# Patient Record
Sex: FEMALE | Race: WHITE | NOT HISPANIC OR LATINO | Employment: OTHER | URBAN - METROPOLITAN AREA
[De-identification: names, ages, dates, MRNs, and addresses within clinical notes are randomized per-mention and may not be internally consistent; named-entity substitution may affect disease eponyms.]

---

## 2017-06-23 ENCOUNTER — OFFICE VISIT (OUTPATIENT)
Dept: ORTHOPEDICS | Facility: CLINIC | Age: 77
End: 2017-06-23
Payer: MEDICARE

## 2017-06-23 VITALS
HEIGHT: 63 IN | SYSTOLIC BLOOD PRESSURE: 114 MMHG | BODY MASS INDEX: 26.58 KG/M2 | DIASTOLIC BLOOD PRESSURE: 49 MMHG | HEART RATE: 88 BPM | WEIGHT: 150 LBS

## 2017-06-23 DIAGNOSIS — S72.002D CLOSED FRACTURE OF NECK OF LEFT FEMUR WITH ROUTINE HEALING, SUBSEQUENT ENCOUNTER: Primary | ICD-10-CM

## 2017-06-23 DIAGNOSIS — Z98.890 POST-OPERATIVE STATE: ICD-10-CM

## 2017-06-23 PROCEDURE — 99024 POSTOP FOLLOW-UP VISIT: CPT | Mod: ,,, | Performed by: ORTHOPAEDIC SURGERY

## 2017-06-23 RX ORDER — ENOXAPARIN SODIUM 150 MG/ML
INJECTION SUBCUTANEOUS
COMMUNITY
End: 2022-01-01

## 2017-06-23 RX ORDER — POLYETHYLENE GLYCOL 3350 17 G/17G
17 POWDER, FOR SOLUTION ORAL DAILY
Status: ON HOLD | COMMUNITY
End: 2022-01-01 | Stop reason: HOSPADM

## 2017-06-23 RX ORDER — ISOSORBIDE MONONITRATE 60 MG/1
60 TABLET, EXTENDED RELEASE ORAL DAILY
COMMUNITY
End: 2017-10-06 | Stop reason: DRUGHIGH

## 2017-06-23 RX ORDER — CLONAZEPAM 0.5 MG/1
0.5 TABLET ORAL 2 TIMES DAILY PRN
Status: ON HOLD | COMMUNITY
End: 2022-01-01 | Stop reason: HOSPADM

## 2017-06-23 RX ORDER — PRAVASTATIN SODIUM 20 MG/1
20 TABLET ORAL DAILY
COMMUNITY
End: 2017-10-06 | Stop reason: DRUGHIGH

## 2017-06-23 RX ORDER — DOCUSATE SODIUM 100 MG/1
100 CAPSULE, LIQUID FILLED ORAL 2 TIMES DAILY
Status: ON HOLD | COMMUNITY
End: 2022-01-01 | Stop reason: HOSPADM

## 2017-06-23 RX ORDER — LEVOTHYROXINE SODIUM 75 UG/1
75 TABLET ORAL DAILY
COMMUNITY
End: 2017-10-06 | Stop reason: DRUGHIGH

## 2017-06-23 RX ORDER — INSULIN ASPART 100 [IU]/ML
INJECTION, SOLUTION INTRAVENOUS; SUBCUTANEOUS
COMMUNITY
End: 2022-01-01

## 2017-06-23 RX ORDER — OXYCODONE HCL 5 MG/5 ML
SOLUTION, ORAL ORAL
COMMUNITY
End: 2022-01-01

## 2017-06-23 RX ORDER — TRAMADOL HYDROCHLORIDE 50 MG/1
50 TABLET ORAL 2 TIMES DAILY PRN
Status: ON HOLD | COMMUNITY
Start: 2017-06-05 | End: 2022-01-01 | Stop reason: HOSPADM

## 2017-06-23 RX ORDER — HYDRALAZINE HYDROCHLORIDE 25 MG/1
TABLET, FILM COATED ORAL
COMMUNITY
Start: 2017-05-12 | End: 2022-01-01

## 2017-06-23 RX ORDER — ESCITALOPRAM OXALATE 5 MG/1
5 TABLET ORAL DAILY
COMMUNITY
End: 2017-10-06 | Stop reason: DRUGHIGH

## 2017-06-23 NOTE — PROGRESS NOTES
Subjective:       Chief Complaint    Chief Complaint   Patient presents with    Left Hip - Post-op Evaluation, Injury     DOS: 6/10/2017, reduction and closed pinning of femoral neck fracture left hip.  DOI: 06/09/2017 due to a dog jumping onto her.  Her hip is doing better, and she is attending physical therapy daily.       REMI Cabezas is a 76 y.o.  female who presents Follow-up on closed pinning of minimally displaced femoral neck fracture left hip. Patient is complaining of significant the pain in the hip is much as 8/10 when walking. However, she is getting less pain as time goes on. He is supposed to be toe-touch weightbearing or minimal weightbearing.      Past History  Past Medical History:   Diagnosis Date    Anxiety     Arthritis     Diabetes mellitus type I     Hypertension     Thyroid disease      Past Surgical History:   Procedure Laterality Date    CHOLECYSTECTOMY      CYST REMOVAL      ELBOW SURGERY      HIP SURGERY      left    HYSTERECTOMY      SHOULDER SURGERY       Social History     Social History    Marital status:      Spouse name: N/A    Number of children: N/A    Years of education: N/A     Occupational History    Not on file.     Social History Main Topics    Smoking status: Former Smoker    Smokeless tobacco: Not on file    Alcohol use No    Drug use: Unknown    Sexual activity: Not on file     Other Topics Concern    Not on file     Social History Narrative    No narrative on file         Medications  Current Outpatient Prescriptions   Medication Sig    clonazePAM (KLONOPIN) 0.5 MG tablet Take 0.5 mg by mouth 2 (two) times daily as needed for Anxiety.    docusate sodium (COLACE) 100 MG capsule Take 100 mg by mouth 2 (two) times daily.    enoxaparin (LOVENOX) 150 mg/mL Syrg Inject into the skin.    escitalopram oxalate (LEXAPRO) 5 MG Tab Take 5 mg by mouth once daily.    hydrALAZINE (APRESOLINE) 25 MG tablet     insulin aspart (NOVOLOG) 100 unit/mL  injection Inject into the skin 3 (three) times daily before meals.    isosorbide mononitrate (IMDUR) 60 MG 24 hr tablet Take 60 mg by mouth once daily.    levothyroxine (SYNTHROID) 75 MCG tablet Take 75 mcg by mouth once daily.    oxycodone (ROXICODONE) 5 mg/5 mL Soln Take by mouth.    polyethylene glycol (GLYCOLAX) 17 gram/dose powder Take 17 g by mouth once daily.    pravastatin (PRAVACHOL) 20 MG tablet Take 20 mg by mouth once daily.    tramadol (ULTRAM) 50 mg tablet      No current facility-administered medications for this visit.        Allergies  Review of patient's allergies indicates:   Allergen Reactions    Codeine Shortness Of Breath    Sulfa (sulfonamide antibiotics) Shortness Of Breath         Review of Systems     Constitutional: Negative    HENT: Negative  Eyes: Negative  Respiratory: Negative  Cardiovascular: Negative  Musculoskeletal: HPI  Skin: Negative  Neurological: Negative  Hematological: Negative  Endocrine: Negative      Physical Exam    Vitals:    06/23/17 1408   BP: (!) 114/49   Pulse: 88     Physical Examination:Examination left hip reveals a healed incision laterally with staples, which were removed. She is tender over the lateral aspect of the hip. External rotation is 30°, internal 30, abduction 40, but she has discomfort with movement.     Skin-  General appearance -  well appearing, and in no distress  Mental status - awake  Neck - supple  Chest -  symmetric air entry  Heart - normal rate   Abdomen - soft      Assessment/Plan   Closed fracture of neck of left femur with routine healing, subsequent encounter  -     Cancel: X-Ray Pelvis 3 View inc Hip 2 view Left    Post-operative state  -     Cancel: X-Ray Pelvis 3 View inc Hip 2 view Left      Open 3 weeks. Advised not to bear full weight. Discussed toe-touch for balance only. Is very happy at the [facility]      This note was dictated using voice recognition software and may contain grammatical errors.

## 2017-07-14 ENCOUNTER — OFFICE VISIT (OUTPATIENT)
Dept: ORTHOPEDICS | Facility: CLINIC | Age: 77
End: 2017-07-14
Payer: MEDICARE

## 2017-07-14 VITALS
HEIGHT: 63 IN | HEART RATE: 77 BPM | SYSTOLIC BLOOD PRESSURE: 124 MMHG | DIASTOLIC BLOOD PRESSURE: 50 MMHG | BODY MASS INDEX: 26.58 KG/M2 | WEIGHT: 150 LBS

## 2017-07-14 DIAGNOSIS — Z98.890 POST-OPERATIVE STATE: ICD-10-CM

## 2017-07-14 DIAGNOSIS — S72.002D CLOSED FRACTURE OF NECK OF LEFT FEMUR WITH ROUTINE HEALING, SUBSEQUENT ENCOUNTER: Primary | ICD-10-CM

## 2017-07-14 PROCEDURE — 99024 POSTOP FOLLOW-UP VISIT: CPT | Mod: ,,, | Performed by: ORTHOPAEDIC SURGERY

## 2017-07-14 NOTE — PROGRESS NOTES
Subjective:       Chief Complaint    Chief Complaint   Patient presents with    Post-op Evaluation     4 weeks & 6 days.  DOS: 6/10/2017, reduction and closed pinning of femoral neck fracture left hip.  DOI: 06/09/2017 due to a dog jumping onto her.  She has some soreness.  Patient is @ [facility] & does P.T. daily.  Xrays were done on 7/7/17 & 7/12/17 but not report was provided.       HPI  ePg Cabezas is a 76 y.o.  female who presents Follow-up on femoral neck pinning left hip. She has been compliant and has avoided weightbearing. She gives a history of scoliosis with a right lower extremity being longer than the left. She's been advised that shortening of the femoral neck. Will shorten the left lower extremity.      Past History  Past Medical History:   Diagnosis Date    Anxiety     Arthritis     Diabetes mellitus type I     Hypertension     Thyroid disease      Past Surgical History:   Procedure Laterality Date    CHOLECYSTECTOMY      CYST REMOVAL      ELBOW SURGERY      HIP SURGERY      left    HYSTERECTOMY      SHOULDER SURGERY       Social History     Social History    Marital status:      Spouse name: N/A    Number of children: N/A    Years of education: N/A     Occupational History    Not on file.     Social History Main Topics    Smoking status: Former Smoker    Smokeless tobacco: Never Used    Alcohol use No    Drug use: Unknown    Sexual activity: Not on file     Other Topics Concern    Not on file     Social History Narrative    No narrative on file         Medications  Current Outpatient Prescriptions   Medication Sig    clonazePAM (KLONOPIN) 0.5 MG tablet Take 0.5 mg by mouth 2 (two) times daily as needed for Anxiety.    docusate sodium (COLACE) 100 MG capsule Take 100 mg by mouth 2 (two) times daily.    enoxaparin (LOVENOX) 150 mg/mL Syrg Inject into the skin.    escitalopram oxalate (LEXAPRO) 5 MG Tab Take 5 mg by mouth once daily.    hydrALAZINE  (APRESOLINE) 25 MG tablet     insulin aspart (NOVOLOG) 100 unit/mL injection Inject into the skin 3 (three) times daily before meals.    isosorbide mononitrate (IMDUR) 60 MG 24 hr tablet Take 60 mg by mouth once daily.    levothyroxine (SYNTHROID) 75 MCG tablet Take 75 mcg by mouth once daily.    oxycodone (ROXICODONE) 5 mg/5 mL Soln Take by mouth.    polyethylene glycol (GLYCOLAX) 17 gram/dose powder Take 17 g by mouth once daily.    pravastatin (PRAVACHOL) 20 MG tablet Take 20 mg by mouth once daily.    tramadol (ULTRAM) 50 mg tablet      No current facility-administered medications for this visit.        Allergies  Review of patient's allergies indicates:   Allergen Reactions    Codeine Shortness Of Breath and Anaphylaxis    Erythromycin      Other reaction(s): Unknown  Pt states she doesn't remember the reaction.    Sulfa (sulfonamide antibiotics) Shortness Of Breath    Atorvastatin calcium Rash    Fenofibrate Rash         Review of Systems     Constitutional: Negative    HENT: Negative  Eyes: Negative  Respiratory: Negative  Cardiovascular: Negative  Musculoskeletal: HPI  Skin: Negative  Neurological: Negative  Hematological: Negative  Endocrine: Negative      Physical Exam    Vitals:    07/14/17 1353   BP: (!) 124/50   Pulse: 77     Physical Examination:Examination left hip reveals tenderness over the anterior aspect of the hip. There is discomfort with rotation.     Skin-  General appearance -  well appearing, and in no distress  Mental status - awake  Neck - supple  Chest -  symmetric air entry  Heart - normal rate   Abdomen - soft      Assessment/Plan   Closed fracture of neck of left femur with routine healing, subsequent encounter    Post-operative state      X-ray examination of the left hip shows collapse of the head neck junction with the sliding of the screws. The screws continue to be well positioned, well away from the subchondral bone. The construct looked stable. She will be allowed to  bear weight as tolerated with a walker.      This note was dictated using voice recognition software and may contain grammatical errors.

## 2017-08-18 ENCOUNTER — OFFICE VISIT (OUTPATIENT)
Dept: ORTHOPEDICS | Facility: CLINIC | Age: 77
End: 2017-08-18
Payer: MEDICARE

## 2017-08-18 VITALS
DIASTOLIC BLOOD PRESSURE: 70 MMHG | HEART RATE: 74 BPM | BODY MASS INDEX: 26.58 KG/M2 | SYSTOLIC BLOOD PRESSURE: 141 MMHG | WEIGHT: 150 LBS | HEIGHT: 63 IN

## 2017-08-18 DIAGNOSIS — S72.002D CLOSED FRACTURE OF NECK OF LEFT FEMUR WITH ROUTINE HEALING, SUBSEQUENT ENCOUNTER: ICD-10-CM

## 2017-08-18 DIAGNOSIS — Z98.890 POST-OPERATIVE STATE: Primary | ICD-10-CM

## 2017-08-18 PROCEDURE — 99024 POSTOP FOLLOW-UP VISIT: CPT | Mod: ,,, | Performed by: ORTHOPAEDIC SURGERY

## 2017-08-18 RX ORDER — LEVOTHYROXINE SODIUM 75 UG/1
75 TABLET ORAL
COMMUNITY
Start: 2017-07-26 | End: 2017-09-22

## 2017-08-18 RX ORDER — TRAMADOL HYDROCHLORIDE 50 MG/1
50 TABLET ORAL
COMMUNITY
Start: 2017-07-26 | End: 2017-09-22

## 2017-08-18 RX ORDER — DOCUSATE SODIUM 100 MG/1
100 CAPSULE, LIQUID FILLED ORAL
COMMUNITY
End: 2017-09-22

## 2017-08-18 RX ORDER — ISOSORBIDE MONONITRATE 60 MG/1
60 TABLET, EXTENDED RELEASE ORAL
COMMUNITY
Start: 2017-07-26 | End: 2017-09-22

## 2017-08-18 RX ORDER — CLONAZEPAM 0.5 MG/1
0.5 TABLET ORAL
COMMUNITY
Start: 2017-07-26 | End: 2017-09-22

## 2017-08-18 RX ORDER — POLYETHYLENE GLYCOL 3350 17 G/17G
17 POWDER, FOR SOLUTION ORAL
COMMUNITY
End: 2017-09-22

## 2017-08-18 RX ORDER — ESCITALOPRAM OXALATE 5 MG/1
5 TABLET ORAL
COMMUNITY
Start: 2017-07-26 | End: 2017-09-22

## 2017-08-18 RX ORDER — OXYCODONE HCL 5 MG/5 ML
5 SOLUTION, ORAL ORAL
COMMUNITY
End: 2017-09-22

## 2017-08-18 RX ORDER — PRAVASTATIN SODIUM 20 MG/1
20 TABLET ORAL
COMMUNITY
Start: 2017-07-26 | End: 2017-09-22

## 2017-08-18 NOTE — PROGRESS NOTES
Subjective:       Chief Complaint    Chief Complaint   Patient presents with    Left Hip - Post-op Evaluation     Post-op 9 weeks and 6 days. DOS: 6/10/2017, reduction and closed pinning of femoral neck fracture left hip.  DOI: 06/09/2017 due to a dog jumping onto her.  She has some soreness.  Patient is doing exercises daily at home.       REMI Cabezas is a 76 y.o.  female who presents Follow-up on closed pinning femoral neck fracture left hip. Appears to be having significant pain in the left groin and trochanteric area. Previous x-rays showed collapse of the construct to a stable position. When she walks a lot. It hurts her. Prior to her fall. She was taking 1 or 2 tramadol per day for chronic low back pain.      Past History  Past Medical History:   Diagnosis Date    Anxiety     Arthritis     Diabetes mellitus type I     Hypertension     Thyroid disease      Past Surgical History:   Procedure Laterality Date    CHOLECYSTECTOMY      CYST REMOVAL      ELBOW SURGERY      HIP SURGERY      left    HYSTERECTOMY      SHOULDER SURGERY       Social History     Social History    Marital status:      Spouse name: N/A    Number of children: N/A    Years of education: N/A     Occupational History    Not on file.     Social History Main Topics    Smoking status: Former Smoker    Smokeless tobacco: Never Used    Alcohol use No    Drug use: Unknown    Sexual activity: Not on file     Other Topics Concern    Not on file     Social History Narrative    No narrative on file         Medications  Current Outpatient Prescriptions   Medication Sig    clonazePAM (KLONOPIN) 0.5 MG tablet Take 0.5 mg by mouth 2 (two) times daily as needed for Anxiety.    clonazePAM (KLONOPIN) 0.5 MG tablet Take 0.5 mg by mouth.    docusate sodium (COLACE) 100 MG capsule Take 100 mg by mouth 2 (two) times daily.    docusate sodium (COLACE) 100 MG capsule Take 100 mg by mouth.    enoxaparin (LOVENOX) 150 mg/mL Syrg  Inject into the skin.    escitalopram oxalate (LEXAPRO) 5 MG Tab Take 5 mg by mouth once daily.    escitalopram oxalate (LEXAPRO) 5 MG Tab Take 5 mg by mouth.    hydrALAZINE (APRESOLINE) 25 MG tablet     insulin aspart (NOVOLOG) 100 unit/mL injection Inject into the skin 3 (three) times daily before meals.    isosorbide mononitrate (IMDUR) 60 MG 24 hr tablet Take 60 mg by mouth once daily.    isosorbide mononitrate (IMDUR) 60 MG 24 hr tablet Take 60 mg by mouth.    levothyroxine (SYNTHROID) 75 MCG tablet Take 75 mcg by mouth once daily.    levothyroxine (SYNTHROID) 75 MCG tablet Take 75 mcg by mouth.    oxycodone (ROXICODONE) 5 mg/5 mL Soln Take by mouth.    oxycodone (ROXICODONE) 5 mg/5 mL Soln Take 5 mg by mouth.    polyethylene glycol (GLYCOLAX) 17 gram/dose powder Take 17 g by mouth once daily.    polyethylene glycol (GLYCOLAX) 17 gram/dose powder Take 17 g by mouth.    pravastatin (PRAVACHOL) 20 MG tablet Take 20 mg by mouth once daily.    pravastatin (PRAVACHOL) 20 MG tablet Take 20 mg by mouth.    tramadol (ULTRAM) 50 mg tablet     tramadol (ULTRAM) 50 mg tablet Take 50 mg by mouth.     No current facility-administered medications for this visit.        Allergies  Review of patient's allergies indicates:   Allergen Reactions    Codeine Shortness Of Breath and Anaphylaxis    Erythromycin      Other reaction(s): Unknown  Pt states she doesn't remember the reaction.    Sulfa (sulfonamide antibiotics) Shortness Of Breath    Atorvastatin calcium Rash    Fenofibrate Rash         Review of Systems     Constitutional: Negative    HENT: Negative  Eyes: Negative  Respiratory: Negative  Cardiovascular: Negative  Musculoskeletal: HPI  Skin: Negative  Neurological: Negative  Hematological: Negative  Endocrine: Negative      Physical Exam    Vitals:    08/18/17 1424   BP: (!) 141/70   Pulse: 74     Physical Examination:Very tender over the trochanteric bursa left hip. Anterior hip tenderness present.  Abduction 30, 35°. External rotation 60°, internal rotation 20°. Terminal motion appears to be very uncomfortable.     Skin-clear  General appearance -  well appearing, and in no distress  Mental status - awake  Neck - supple  Chest -  symmetric air entry  Heart - normal rate   Abdomen - soft      Assessment/Plan   Post-operative state    Closed fracture of neck of left femur with routine healing, subsequent encounter    2 views of the left hip femoral neck fracture appears healed. Also appears to have cystic changes in the humeral head. Will discuss possibility of which study to do to evaluate the femoral head for AVN.        This note was dictated using voice recognition software and may contain grammatical errors.

## 2017-09-22 ENCOUNTER — OFFICE VISIT (OUTPATIENT)
Dept: ORTHOPEDICS | Facility: CLINIC | Age: 77
End: 2017-09-22
Payer: MEDICARE

## 2017-09-22 VITALS
HEIGHT: 63 IN | SYSTOLIC BLOOD PRESSURE: 97 MMHG | BODY MASS INDEX: 26.58 KG/M2 | HEART RATE: 76 BPM | WEIGHT: 150 LBS | DIASTOLIC BLOOD PRESSURE: 42 MMHG

## 2017-09-22 DIAGNOSIS — Z98.890 POST-OPERATIVE STATE: Primary | ICD-10-CM

## 2017-09-22 DIAGNOSIS — S72.002D CLOSED FRACTURE OF NECK OF LEFT FEMUR WITH ROUTINE HEALING, SUBSEQUENT ENCOUNTER: ICD-10-CM

## 2017-09-22 PROCEDURE — 99213 OFFICE O/P EST LOW 20 MIN: CPT | Mod: ,,, | Performed by: ORTHOPAEDIC SURGERY

## 2017-09-22 PROCEDURE — 1159F MED LIST DOCD IN RCRD: CPT | Mod: ,,, | Performed by: ORTHOPAEDIC SURGERY

## 2017-09-22 PROCEDURE — 73502 X-RAY EXAM HIP UNI 2-3 VIEWS: CPT | Mod: LT,,, | Performed by: ORTHOPAEDIC SURGERY

## 2017-09-22 RX ORDER — TRAMADOL HYDROCHLORIDE 50 MG/1
100 TABLET ORAL EVERY 12 HOURS PRN
Qty: 120 TABLET | Refills: 3 | Status: SHIPPED | OUTPATIENT
Start: 2017-09-22 | End: 2017-10-02

## 2017-09-22 RX ORDER — TRAMADOL HYDROCHLORIDE 50 MG/1
50 TABLET ORAL DAILY
COMMUNITY
Start: 2017-09-13 | End: 2017-09-22

## 2017-09-22 NOTE — PROGRESS NOTES
Subjective:       Chief Complaint    Chief Complaint   Patient presents with    Follow-up     Left hip- DOS: 6/10/2017, reduction and closed pinning of femoral neck fracture left hip.  DOI: 06/09/2017 due to a dog jumping onto her.  Her left hip is still painful and using a walker.       REMI Cabezas is a 76 y.o.  female who presents Continued pain in the left groin areas following percutaneous pinning of femoral neck fracture. The pain level skin gets 7-8 4/10. It has not worsened nor has it improved. She gets relief with rest.      Past History  Past Medical History:   Diagnosis Date    Anxiety     Arthritis     Diabetes mellitus type I     Hypertension     Thyroid disease      Past Surgical History:   Procedure Laterality Date    CHOLECYSTECTOMY      CYST REMOVAL      ELBOW SURGERY      HIP SURGERY      left    HYSTERECTOMY      SHOULDER SURGERY       Social History     Social History    Marital status:      Spouse name: N/A    Number of children: N/A    Years of education: N/A     Occupational History    Not on file.     Social History Main Topics    Smoking status: Former Smoker    Smokeless tobacco: Never Used    Alcohol use No    Drug use: Unknown    Sexual activity: Not on file     Other Topics Concern    Not on file     Social History Narrative    No narrative on file         Medications  Current Outpatient Prescriptions   Medication Sig    clonazePAM (KLONOPIN) 0.5 MG tablet Take 0.5 mg by mouth 2 (two) times daily as needed for Anxiety.    docusate sodium (COLACE) 100 MG capsule Take 100 mg by mouth 2 (two) times daily.    enoxaparin (LOVENOX) 150 mg/mL Syrg Inject into the skin.    escitalopram oxalate (LEXAPRO) 5 MG Tab Take 5 mg by mouth once daily.    hydrALAZINE (APRESOLINE) 25 MG tablet     insulin aspart (NOVOLOG) 100 unit/mL injection Inject into the skin 3 (three) times daily before meals.    isosorbide mononitrate (IMDUR) 60 MG 24 hr tablet Take 60 mg  by mouth once daily.    levothyroxine (SYNTHROID) 75 MCG tablet Take 75 mcg by mouth once daily.    oxycodone (ROXICODONE) 5 mg/5 mL Soln Take by mouth.    polyethylene glycol (GLYCOLAX) 17 gram/dose powder Take 17 g by mouth once daily.    pravastatin (PRAVACHOL) 20 MG tablet Take 20 mg by mouth once daily.    tramadol (ULTRAM) 50 mg tablet      No current facility-administered medications for this visit.        Allergies  Review of patient's allergies indicates:   Allergen Reactions    Codeine Shortness Of Breath and Anaphylaxis    Erythromycin      Other reaction(s): Unknown  Pt states she doesn't remember the reaction.    Sulfa (sulfonamide antibiotics) Shortness Of Breath    Atorvastatin calcium Rash    Fenofibrate Rash         Review of Systems     Constitutional: Negative    HENT: Negative  Eyes: Negative  Respiratory: Negative  Cardiovascular: Negative  Musculoskeletal: HPI  Skin: Negative  Neurological: Negative  Hematological: Negative  Endocrine: Negative      Physical Exam    Vitals:    09/22/17 1129   BP: (!) 97/42   Pulse: 76     Physical Examination:Tender over the anterior aspect of the left hip. Abduction 35° with pain. Hip flexion to 90° with external rotation 60°, internal rotation 15°. Terminal motion is painful. She walks with a prominent pronounced limp.     Skin-clear  General appearance -  well appearing, and in no distress  Mental status - awake  Neck - supple  Chest -  symmetric air entry  Heart - normal rate   Abdomen - soft      Assessment/Plan   Post-operative state  -     X-Ray Hip 2 or 3 views Left    Closed fracture of neck of left femur with routine healing, subsequent encounter  -     X-Ray Hip 2 or 3 views Left            This note was dictated using voice recognition software and may contain grammatical errors.

## 2017-10-06 ENCOUNTER — OFFICE VISIT (OUTPATIENT)
Dept: ORTHOPEDICS | Facility: CLINIC | Age: 77
End: 2017-10-06
Payer: MEDICARE

## 2017-10-06 VITALS
SYSTOLIC BLOOD PRESSURE: 125 MMHG | DIASTOLIC BLOOD PRESSURE: 66 MMHG | HEIGHT: 63 IN | WEIGHT: 150 LBS | HEART RATE: 74 BPM | BODY MASS INDEX: 26.58 KG/M2

## 2017-10-06 DIAGNOSIS — S72.002D CLOSED FRACTURE OF NECK OF LEFT FEMUR WITH ROUTINE HEALING, SUBSEQUENT ENCOUNTER: ICD-10-CM

## 2017-10-06 DIAGNOSIS — M70.62 GREATER TROCHANTERIC BURSITIS OF LEFT HIP: Primary | ICD-10-CM

## 2017-10-06 DIAGNOSIS — Z98.890 POST-OPERATIVE STATE: ICD-10-CM

## 2017-10-06 PROCEDURE — 99212 OFFICE O/P EST SF 10 MIN: CPT | Mod: ,,, | Performed by: ORTHOPAEDIC SURGERY

## 2017-10-06 RX ORDER — MECLIZINE HYDROCHLORIDE 25 MG/1
1 TABLET ORAL 3 TIMES DAILY
COMMUNITY
Start: 2017-09-29 | End: 2022-01-01

## 2017-10-06 RX ORDER — ISOSORBIDE MONONITRATE 30 MG/1
1 TABLET, EXTENDED RELEASE ORAL DAILY
COMMUNITY
Start: 2017-09-29 | End: 2022-01-01

## 2017-10-06 RX ORDER — PRAVASTATIN SODIUM 40 MG/1
1 TABLET ORAL DAILY
COMMUNITY
Start: 2017-09-29 | End: 2022-01-01

## 2017-10-06 RX ORDER — GABAPENTIN 100 MG/1
1 CAPSULE ORAL 3 TIMES DAILY
Status: ON HOLD | COMMUNITY
Start: 2017-09-29 | End: 2022-01-01 | Stop reason: HOSPADM

## 2017-10-06 RX ORDER — LEVOTHYROXINE SODIUM 88 UG/1
1 TABLET ORAL DAILY
Status: ON HOLD | COMMUNITY
Start: 2017-09-29 | End: 2022-01-01 | Stop reason: HOSPADM

## 2017-10-06 RX ORDER — ESCITALOPRAM OXALATE 20 MG/1
1 TABLET ORAL DAILY
Status: ON HOLD | COMMUNITY
Start: 2017-09-29 | End: 2022-01-01 | Stop reason: HOSPADM

## 2017-10-06 NOTE — PROGRESS NOTES
Subjective:       Chief Complaint    Chief Complaint   Patient presents with    Results     Patient is here to review her MRI of the left hip w/o contrast performed on 9/27/17 @ [facility]. Her hip is getting worse.  She is currently using a walker to ambulate.        HPI  Peg Cabezas is a 76 y.o.  female who presents Follow-up on left hip MRI. Report does not identify avascular necrosis in the left hip. It does mention atrophy of the gluteus medius and minimus. She has been more active than usual.      Past History  Past Medical History:   Diagnosis Date    Anxiety     Arthritis     Diabetes mellitus type I     Hypertension     Thyroid disease      Past Surgical History:   Procedure Laterality Date    CHOLECYSTECTOMY      CYST REMOVAL      ELBOW SURGERY      HIP SURGERY      left    HYSTERECTOMY      SHOULDER SURGERY       Social History     Social History    Marital status:      Spouse name: N/A    Number of children: N/A    Years of education: N/A     Occupational History    Not on file.     Social History Main Topics    Smoking status: Former Smoker    Smokeless tobacco: Never Used    Alcohol use No    Drug use: Unknown    Sexual activity: Not on file     Other Topics Concern    Not on file     Social History Narrative    No narrative on file         Medications  Current Outpatient Prescriptions   Medication Sig    clonazePAM (KLONOPIN) 0.5 MG tablet Take 0.5 mg by mouth 2 (two) times daily as needed for Anxiety.    docusate sodium (COLACE) 100 MG capsule Take 100 mg by mouth 2 (two) times daily.    enoxaparin (LOVENOX) 150 mg/mL Syrg Inject into the skin.    escitalopram oxalate (LEXAPRO) 20 MG tablet Take 1 tablet by mouth once daily.    gabapentin (NEURONTIN) 100 MG capsule Take 1 capsule by mouth 3 (three) times daily.    hydrALAZINE (APRESOLINE) 25 MG tablet     insulin aspart (NOVOLOG) 100 unit/mL injection Inject into the skin 3 (three) times daily before meals.     isosorbide mononitrate (IMDUR) 30 MG 24 hr tablet Take 1 tablet by mouth once daily.    levothyroxine (SYNTHROID) 88 MCG tablet Take 1 tablet by mouth once daily.    meclizine (ANTIVERT) 25 mg tablet Take 1 tablet by mouth 3 (three) times daily.    oxycodone (ROXICODONE) 5 mg/5 mL Soln Take by mouth.    polyethylene glycol (GLYCOLAX) 17 gram/dose powder Take 17 g by mouth once daily.    pravastatin (PRAVACHOL) 40 MG tablet Take 1 tablet by mouth once daily.    tramadol (ULTRAM) 50 mg tablet      No current facility-administered medications for this visit.        Allergies  Review of patient's allergies indicates:   Allergen Reactions    Codeine Shortness Of Breath and Anaphylaxis    Erythromycin      Other reaction(s): Unknown  Pt states she doesn't remember the reaction.    Sulfa (sulfonamide antibiotics) Shortness Of Breath    Atorvastatin calcium Rash    Fenofibrate Rash         Review of Systems     Constitutional: Negative    HENT: Negative  Eyes: Negative  Respiratory: Negative  Cardiovascular: Negative  Musculoskeletal: HPI  Skin: Negative  Neurological: Negative  Hematological: Negative  Endocrine: Negative      Physical Exam    Vitals:    10/06/17 1022   BP: 125/66   Pulse: 74     Physical Examination:Examination left hip reveals abduction 40° flexion. 105°, external rotation 60°, internal rotation 20°. Very tender over the greater trochanteric bursa     Skin-clear  General appearance -  well appearing, and in no distress  Mental status - awake  Neck - supple  Chest -  symmetric air entry  Heart - normal rate   Abdomen - soft      Assessment/Plan   Greater trochanteric bursitis of left hip    Closed fracture of neck of left femur with routine healing, subsequent encounter    Post-operative state    . We'll start physical therapy at [facility] in [place], [place].        This note was dictated using voice recognition software and may contain grammatical errors.

## 2017-11-17 ENCOUNTER — OFFICE VISIT (OUTPATIENT)
Dept: ORTHOPEDICS | Facility: CLINIC | Age: 77
End: 2017-11-17
Payer: MEDICARE

## 2017-11-17 VITALS
HEIGHT: 63 IN | SYSTOLIC BLOOD PRESSURE: 119 MMHG | HEART RATE: 82 BPM | BODY MASS INDEX: 26.58 KG/M2 | DIASTOLIC BLOOD PRESSURE: 64 MMHG | WEIGHT: 150 LBS

## 2017-11-17 DIAGNOSIS — S72.002D CLOSED FRACTURE OF NECK OF LEFT FEMUR WITH ROUTINE HEALING, SUBSEQUENT ENCOUNTER: Primary | ICD-10-CM

## 2017-11-17 PROCEDURE — 99212 OFFICE O/P EST SF 10 MIN: CPT | Mod: ,,, | Performed by: ORTHOPAEDIC SURGERY

## 2017-11-17 NOTE — PROGRESS NOTES
Subjective:       Chief Complaint    Chief Complaint   Patient presents with    Follow-up     Left hip. DOS: 6/10/2017, reduction and closed pinning of femoral neck fracture left hip.  DOI: 06/09/2017 due to a dog jumping onto her. Patient states she is doing well and has no complaints. She is attending PT @ [facility] 3 times a week stating it is going ok. She is feeling a little pain and discomfort due to having out of town company and being on her feet more. She is taking Tramadol twice daily with good relief.        HPI  Peg Cabezas is a 77 y.o.  female who presents Follow-up femoral neck fracture left hip. Overall doing better than previously. Attending physical therapy 3 days a week at [facility]. And she is enjoying going there.      Past History  Past Medical History:   Diagnosis Date    Anxiety     Arthritis     Diabetes mellitus type I     Hypertension     Thyroid disease      Past Surgical History:   Procedure Laterality Date    CHOLECYSTECTOMY      CYST REMOVAL      ELBOW SURGERY      HIP SURGERY      left    HYSTERECTOMY      SHOULDER SURGERY       Social History     Social History    Marital status:      Spouse name: N/A    Number of children: N/A    Years of education: N/A     Occupational History    Not on file.     Social History Main Topics    Smoking status: Former Smoker    Smokeless tobacco: Never Used    Alcohol use No    Drug use: Unknown    Sexual activity: Not on file     Other Topics Concern    Not on file     Social History Narrative    No narrative on file         Medications  Current Outpatient Prescriptions   Medication Sig    clonazePAM (KLONOPIN) 0.5 MG tablet Take 0.5 mg by mouth 2 (two) times daily as needed for Anxiety.    docusate sodium (COLACE) 100 MG capsule Take 100 mg by mouth 2 (two) times daily.    enoxaparin (LOVENOX) 150 mg/mL Syrg Inject into the skin.    escitalopram oxalate (LEXAPRO) 20 MG tablet Take 1 tablet by mouth once  daily.    gabapentin (NEURONTIN) 100 MG capsule Take 1 capsule by mouth 3 (three) times daily.    hydrALAZINE (APRESOLINE) 25 MG tablet     insulin aspart (NOVOLOG) 100 unit/mL injection Inject into the skin 3 (three) times daily before meals.    isosorbide mononitrate (IMDUR) 30 MG 24 hr tablet Take 1 tablet by mouth once daily.    levothyroxine (SYNTHROID) 88 MCG tablet Take 1 tablet by mouth once daily.    meclizine (ANTIVERT) 25 mg tablet Take 1 tablet by mouth 3 (three) times daily.    oxycodone (ROXICODONE) 5 mg/5 mL Soln Take by mouth.    polyethylene glycol (GLYCOLAX) 17 gram/dose powder Take 17 g by mouth once daily.    pravastatin (PRAVACHOL) 40 MG tablet Take 1 tablet by mouth once daily.    tramadol (ULTRAM) 50 mg tablet      No current facility-administered medications for this visit.        Allergies  Review of patient's allergies indicates:   Allergen Reactions    Codeine Shortness Of Breath and Anaphylaxis    Erythromycin      Other reaction(s): Unknown  Pt states she doesn't remember the reaction.    Sulfa (sulfonamide antibiotics) Shortness Of Breath    Atorvastatin calcium Rash    Fenofibrate Rash         Review of Systems     Constitutional: Negative    HENT: Negative  Eyes: Negative  Respiratory: Negative  Cardiovascular: Negative  Musculoskeletal: HPI  Skin: Negative  Neurological: Negative  Hematological: Negative  Endocrine: Negative      Physical Exam    Vitals:    11/17/17 1158   BP: 119/64   Pulse: 82     Physical Examination:Left hip. Nontender anteriorly. Abduction 40° flexion past 90°, external rotation 60°, internal rotation 20°.     Skin-  General appearance -  well appearing, and in no distress  Mental status - awake  Neck - supple  Chest -  symmetric air entry  Heart - normal rate   Abdomen - soft      Assessment/Plan   Closed fracture of neck of left femur with routine healing, subsequent encounter        Ambulates with a walker. Continue physical therapy at [facility].    This note was dictated using voice recognition software and may contain grammatical errors.

## 2022-01-01 ENCOUNTER — HOSPITAL ENCOUNTER (INPATIENT)
Facility: HOSPITAL | Age: 82
LOS: 7 days | DRG: 189 | End: 2022-01-14
Attending: EMERGENCY MEDICINE | Admitting: INTERNAL MEDICINE
Payer: MEDICARE

## 2022-01-01 VITALS
TEMPERATURE: 98 F | RESPIRATION RATE: 30 BRPM | SYSTOLIC BLOOD PRESSURE: 146 MMHG | HEIGHT: 62 IN | DIASTOLIC BLOOD PRESSURE: 66 MMHG | OXYGEN SATURATION: 24 % | BODY MASS INDEX: 29.25 KG/M2 | WEIGHT: 158.94 LBS

## 2022-01-01 DIAGNOSIS — N17.9 AKI (ACUTE KIDNEY INJURY): ICD-10-CM

## 2022-01-01 DIAGNOSIS — J12.82 PNEUMONIA DUE TO COVID-19 VIRUS: Primary | ICD-10-CM

## 2022-01-01 DIAGNOSIS — U07.1 COVID-19: ICD-10-CM

## 2022-01-01 DIAGNOSIS — D64.9 ANEMIA, UNSPECIFIED TYPE: ICD-10-CM

## 2022-01-01 DIAGNOSIS — J96.21 ACUTE ON CHRONIC RESPIRATORY FAILURE WITH HYPOXIA: ICD-10-CM

## 2022-01-01 DIAGNOSIS — U07.1 PNEUMONIA DUE TO COVID-19 VIRUS: Primary | ICD-10-CM

## 2022-01-01 DIAGNOSIS — R07.9 CHEST PAIN: ICD-10-CM

## 2022-01-01 DIAGNOSIS — J96.91 RESPIRATORY FAILURE WITH HYPOXIA: ICD-10-CM

## 2022-01-01 DIAGNOSIS — R09.02 HYPOXIA: ICD-10-CM

## 2022-01-01 LAB
ABO + RH BLD: NORMAL
ALBUMIN SERPL BCP-MCNC: 2.3 G/DL (ref 3.5–5.2)
ALBUMIN SERPL BCP-MCNC: 2.4 G/DL (ref 3.5–5.2)
ALBUMIN SERPL BCP-MCNC: 2.5 G/DL (ref 3.5–5.2)
ALBUMIN SERPL BCP-MCNC: 2.5 G/DL (ref 3.5–5.2)
ALBUMIN SERPL BCP-MCNC: 2.7 G/DL (ref 3.5–5.2)
ALBUMIN SERPL BCP-MCNC: 2.9 G/DL (ref 3.5–5.2)
ALBUMIN SERPL BCP-MCNC: 3.1 G/DL (ref 3.5–5.2)
ALBUMIN SERPL BCP-MCNC: 3.1 G/DL (ref 3.5–5.2)
ALLENS TEST: ABNORMAL
ALP SERPL-CCNC: 47 U/L (ref 55–135)
ALP SERPL-CCNC: 50 U/L (ref 55–135)
ALP SERPL-CCNC: 51 U/L (ref 55–135)
ALP SERPL-CCNC: 51 U/L (ref 55–135)
ALP SERPL-CCNC: 56 U/L (ref 55–135)
ALP SERPL-CCNC: 60 U/L (ref 55–135)
ALP SERPL-CCNC: 61 U/L (ref 55–135)
ALP SERPL-CCNC: 68 U/L (ref 55–135)
ALT SERPL W/O P-5'-P-CCNC: 21 U/L (ref 10–44)
ALT SERPL W/O P-5'-P-CCNC: 21 U/L (ref 10–44)
ALT SERPL W/O P-5'-P-CCNC: 22 U/L (ref 10–44)
ALT SERPL W/O P-5'-P-CCNC: 23 U/L (ref 10–44)
ALT SERPL W/O P-5'-P-CCNC: 23 U/L (ref 10–44)
ALT SERPL W/O P-5'-P-CCNC: 35 U/L (ref 10–44)
ALT SERPL W/O P-5'-P-CCNC: 38 U/L (ref 10–44)
ALT SERPL W/O P-5'-P-CCNC: 78 U/L (ref 10–44)
ANION GAP SERPL CALC-SCNC: 12 MMOL/L (ref 8–16)
ANION GAP SERPL CALC-SCNC: 12 MMOL/L (ref 8–16)
ANION GAP SERPL CALC-SCNC: 13 MMOL/L (ref 8–16)
ANION GAP SERPL CALC-SCNC: 13 MMOL/L (ref 8–16)
ANION GAP SERPL CALC-SCNC: 14 MMOL/L (ref 8–16)
ANION GAP SERPL CALC-SCNC: 14 MMOL/L (ref 8–16)
ANION GAP SERPL CALC-SCNC: 17 MMOL/L (ref 8–16)
ANION GAP SERPL CALC-SCNC: 9 MMOL/L (ref 8–16)
AST SERPL-CCNC: 23 U/L (ref 10–40)
AST SERPL-CCNC: 24 U/L (ref 10–40)
AST SERPL-CCNC: 25 U/L (ref 10–40)
AST SERPL-CCNC: 27 U/L (ref 10–40)
AST SERPL-CCNC: 29 U/L (ref 10–40)
AST SERPL-CCNC: 32 U/L (ref 10–40)
AST SERPL-CCNC: 43 U/L (ref 10–40)
AST SERPL-CCNC: 61 U/L (ref 10–40)
BACTERIA BLD CULT: NORMAL
BACTERIA BLD CULT: NORMAL
BASOPHILS # BLD AUTO: 0 K/UL (ref 0–0.2)
BASOPHILS # BLD AUTO: 0 K/UL (ref 0–0.2)
BASOPHILS # BLD AUTO: 0.01 K/UL (ref 0–0.2)
BASOPHILS # BLD AUTO: 0.02 K/UL (ref 0–0.2)
BASOPHILS NFR BLD: 0 % (ref 0–1.9)
BASOPHILS NFR BLD: 0 % (ref 0–1.9)
BASOPHILS NFR BLD: 0.1 % (ref 0–1.9)
BASOPHILS NFR BLD: 0.2 % (ref 0–1.9)
BILIRUB SERPL-MCNC: 0.6 MG/DL (ref 0.1–1)
BILIRUB SERPL-MCNC: 0.6 MG/DL (ref 0.1–1)
BILIRUB SERPL-MCNC: 0.7 MG/DL (ref 0.1–1)
BILIRUB SERPL-MCNC: 0.8 MG/DL (ref 0.1–1)
BILIRUB SERPL-MCNC: 1 MG/DL (ref 0.1–1)
BILIRUB SERPL-MCNC: 1.1 MG/DL (ref 0.1–1)
BLD GP AB SCN CELLS X3 SERPL QL: NORMAL
BLD PROD TYP BPU: NORMAL
BLOOD UNIT EXPIRATION DATE: NORMAL
BLOOD UNIT TYPE CODE: 5100
BLOOD UNIT TYPE: NORMAL
BNP SERPL-MCNC: 247 PG/ML (ref 0–99)
BUN SERPL-MCNC: 43 MG/DL (ref 8–23)
BUN SERPL-MCNC: 45 MG/DL (ref 8–23)
BUN SERPL-MCNC: 52 MG/DL (ref 8–23)
BUN SERPL-MCNC: 57 MG/DL (ref 8–23)
BUN SERPL-MCNC: 58 MG/DL (ref 8–23)
BUN SERPL-MCNC: 59 MG/DL (ref 8–23)
BUN SERPL-MCNC: 62 MG/DL (ref 8–23)
BUN SERPL-MCNC: 64 MG/DL (ref 8–23)
CALCIUM SERPL-MCNC: 8.5 MG/DL (ref 8.7–10.5)
CALCIUM SERPL-MCNC: 8.5 MG/DL (ref 8.7–10.5)
CALCIUM SERPL-MCNC: 8.9 MG/DL (ref 8.7–10.5)
CALCIUM SERPL-MCNC: 9 MG/DL (ref 8.7–10.5)
CALCIUM SERPL-MCNC: 9.1 MG/DL (ref 8.7–10.5)
CALCIUM SERPL-MCNC: 9.1 MG/DL (ref 8.7–10.5)
CHLORIDE SERPL-SCNC: 102 MMOL/L (ref 95–110)
CHLORIDE SERPL-SCNC: 103 MMOL/L (ref 95–110)
CHLORIDE SERPL-SCNC: 97 MMOL/L (ref 95–110)
CHLORIDE SERPL-SCNC: 97 MMOL/L (ref 95–110)
CHLORIDE SERPL-SCNC: 99 MMOL/L (ref 95–110)
CK SERPL-CCNC: 66 U/L (ref 20–180)
CO2 SERPL-SCNC: 18 MMOL/L (ref 23–29)
CO2 SERPL-SCNC: 18 MMOL/L (ref 23–29)
CO2 SERPL-SCNC: 19 MMOL/L (ref 23–29)
CO2 SERPL-SCNC: 19 MMOL/L (ref 23–29)
CO2 SERPL-SCNC: 20 MMOL/L (ref 23–29)
CO2 SERPL-SCNC: 20 MMOL/L (ref 23–29)
CO2 SERPL-SCNC: 21 MMOL/L (ref 23–29)
CO2 SERPL-SCNC: 21 MMOL/L (ref 23–29)
CODING SYSTEM: NORMAL
CREAT SERPL-MCNC: 1.5 MG/DL (ref 0.5–1.4)
CREAT SERPL-MCNC: 1.8 MG/DL (ref 0.5–1.4)
CREAT SERPL-MCNC: 1.9 MG/DL (ref 0.5–1.4)
CREAT SERPL-MCNC: 1.9 MG/DL (ref 0.5–1.4)
CREAT SERPL-MCNC: 2 MG/DL (ref 0.5–1.4)
CREAT SERPL-MCNC: 2.2 MG/DL (ref 0.5–1.4)
CRP SERPL-MCNC: 10.68 MG/DL
CRP SERPL-MCNC: 28.1 MG/DL
CRP SERPL-MCNC: 33.83 MG/DL
CRP SERPL-MCNC: 6.72 MG/DL
D DIMER PPP IA.FEU-MCNC: 0.47 UG/ML FEU
D DIMER PPP IA.FEU-MCNC: 0.92 UG/ML FEU
D DIMER PPP IA.FEU-MCNC: 1.03 UG/ML FEU
D DIMER PPP IA.FEU-MCNC: 2.75 UG/ML FEU
DELSYS: ABNORMAL
DIFFERENTIAL METHOD: ABNORMAL
DISPENSE STATUS: NORMAL
EOSINOPHIL # BLD AUTO: 0 K/UL (ref 0–0.5)
EOSINOPHIL NFR BLD: 0 % (ref 0–8)
EOSINOPHIL NFR BLD: 0.1 % (ref 0–8)
ERYTHROCYTE [DISTWIDTH] IN BLOOD BY AUTOMATED COUNT: 13.6 % (ref 11.5–14.5)
ERYTHROCYTE [DISTWIDTH] IN BLOOD BY AUTOMATED COUNT: 13.7 % (ref 11.5–14.5)
ERYTHROCYTE [DISTWIDTH] IN BLOOD BY AUTOMATED COUNT: 13.8 % (ref 11.5–14.5)
ERYTHROCYTE [DISTWIDTH] IN BLOOD BY AUTOMATED COUNT: 13.8 % (ref 11.5–14.5)
ERYTHROCYTE [DISTWIDTH] IN BLOOD BY AUTOMATED COUNT: 14.1 % (ref 11.5–14.5)
ERYTHROCYTE [DISTWIDTH] IN BLOOD BY AUTOMATED COUNT: 14.4 % (ref 11.5–14.5)
ERYTHROCYTE [DISTWIDTH] IN BLOOD BY AUTOMATED COUNT: 14.6 % (ref 11.5–14.5)
ERYTHROCYTE [DISTWIDTH] IN BLOOD BY AUTOMATED COUNT: 14.6 % (ref 11.5–14.5)
EST. GFR  (AFRICAN AMERICAN): 23.2 ML/MIN/1.73 M^2
EST. GFR  (AFRICAN AMERICAN): 26 ML/MIN/1.73 M^2
EST. GFR  (AFRICAN AMERICAN): 27.7 ML/MIN/1.73 M^2
EST. GFR  (AFRICAN AMERICAN): 27.7 ML/MIN/1.73 M^2
EST. GFR  (AFRICAN AMERICAN): 29.6 ML/MIN/1.73 M^2
EST. GFR  (AFRICAN AMERICAN): 36.9 ML/MIN/1.73 M^2
EST. GFR  (NON AFRICAN AMERICAN): 20.1 ML/MIN/1.73 M^2
EST. GFR  (NON AFRICAN AMERICAN): 22.6 ML/MIN/1.73 M^2
EST. GFR  (NON AFRICAN AMERICAN): 24 ML/MIN/1.73 M^2
EST. GFR  (NON AFRICAN AMERICAN): 24 ML/MIN/1.73 M^2
EST. GFR  (NON AFRICAN AMERICAN): 25.7 ML/MIN/1.73 M^2
EST. GFR  (NON AFRICAN AMERICAN): 32 ML/MIN/1.73 M^2
FERRITIN SERPL-MCNC: 1286 NG/ML (ref 20–300)
FERRITIN SERPL-MCNC: 1443 NG/ML (ref 20–300)
FERRITIN SERPL-MCNC: 350 NG/ML (ref 20–300)
FERRITIN SERPL-MCNC: 773 NG/ML (ref 20–300)
FLOW: 8
GLUCOSE SERPL-MCNC: 106 MG/DL (ref 70–110)
GLUCOSE SERPL-MCNC: 108 MG/DL (ref 70–110)
GLUCOSE SERPL-MCNC: 108 MG/DL (ref 70–110)
GLUCOSE SERPL-MCNC: 109 MG/DL (ref 70–110)
GLUCOSE SERPL-MCNC: 117 MG/DL (ref 70–110)
GLUCOSE SERPL-MCNC: 118 MG/DL (ref 70–110)
GLUCOSE SERPL-MCNC: 122 MG/DL (ref 70–110)
GLUCOSE SERPL-MCNC: 122 MG/DL (ref 70–110)
GLUCOSE SERPL-MCNC: 129 MG/DL (ref 70–110)
GLUCOSE SERPL-MCNC: 135 MG/DL (ref 70–110)
GLUCOSE SERPL-MCNC: 140 MG/DL (ref 70–110)
GLUCOSE SERPL-MCNC: 140 MG/DL (ref 70–110)
GLUCOSE SERPL-MCNC: 142 MG/DL (ref 70–110)
GLUCOSE SERPL-MCNC: 142 MG/DL (ref 70–110)
GLUCOSE SERPL-MCNC: 149 MG/DL (ref 70–110)
GLUCOSE SERPL-MCNC: 152 MG/DL (ref 70–110)
GLUCOSE SERPL-MCNC: 152 MG/DL (ref 70–110)
GLUCOSE SERPL-MCNC: 153 MG/DL (ref 70–110)
GLUCOSE SERPL-MCNC: 165 MG/DL (ref 70–110)
GLUCOSE SERPL-MCNC: 171 MG/DL (ref 70–110)
GLUCOSE SERPL-MCNC: 174 MG/DL (ref 70–110)
GLUCOSE SERPL-MCNC: 194 MG/DL (ref 70–110)
GLUCOSE SERPL-MCNC: 194 MG/DL (ref 70–110)
GLUCOSE SERPL-MCNC: 201 MG/DL (ref 70–110)
GLUCOSE SERPL-MCNC: 201 MG/DL (ref 70–110)
GLUCOSE SERPL-MCNC: 241 MG/DL (ref 70–110)
GLUCOSE SERPL-MCNC: 254 MG/DL (ref 70–110)
GLUCOSE SERPL-MCNC: 255 MG/DL (ref 70–110)
GLUCOSE SERPL-MCNC: 84 MG/DL (ref 70–110)
GLUCOSE SERPL-MCNC: 84 MG/DL (ref 70–110)
GLUCOSE SERPL-MCNC: 85 MG/DL (ref 70–110)
GLUCOSE SERPL-MCNC: 91 MG/DL (ref 70–110)
GLUCOSE SERPL-MCNC: 91 MG/DL (ref 70–110)
GLUCOSE SERPL-MCNC: 92 MG/DL (ref 70–110)
GLUCOSE SERPL-MCNC: 93 MG/DL (ref 70–110)
GLUCOSE SERPL-MCNC: 96 MG/DL (ref 70–110)
GLUCOSE SERPL-MCNC: 99 MG/DL (ref 70–110)
HCO3 UR-SCNC: 19.1 MMOL/L (ref 24–28)
HCT VFR BLD AUTO: 24.3 % (ref 37–48.5)
HCT VFR BLD AUTO: 30.6 % (ref 37–48.5)
HCT VFR BLD AUTO: 31 % (ref 37–48.5)
HCT VFR BLD AUTO: 31.2 % (ref 37–48.5)
HCT VFR BLD AUTO: 31.5 % (ref 37–48.5)
HCT VFR BLD AUTO: 36.2 % (ref 37–48.5)
HCT VFR BLD AUTO: 36.4 % (ref 37–48.5)
HCT VFR BLD AUTO: 36.8 % (ref 37–48.5)
HGB BLD-MCNC: 10.1 G/DL (ref 12–16)
HGB BLD-MCNC: 10.1 G/DL (ref 12–16)
HGB BLD-MCNC: 11.6 G/DL (ref 12–16)
HGB BLD-MCNC: 11.8 G/DL (ref 12–16)
HGB BLD-MCNC: 11.8 G/DL (ref 12–16)
HGB BLD-MCNC: 7.6 G/DL (ref 12–16)
HGB BLD-MCNC: 9.8 G/DL (ref 12–16)
HGB BLD-MCNC: 9.9 G/DL (ref 12–16)
IMM GRANULOCYTES # BLD AUTO: 0.05 K/UL (ref 0–0.04)
IMM GRANULOCYTES # BLD AUTO: 0.06 K/UL (ref 0–0.04)
IMM GRANULOCYTES # BLD AUTO: 0.07 K/UL (ref 0–0.04)
IMM GRANULOCYTES # BLD AUTO: 0.08 K/UL (ref 0–0.04)
IMM GRANULOCYTES # BLD AUTO: 0.08 K/UL (ref 0–0.04)
IMM GRANULOCYTES # BLD AUTO: 0.09 K/UL (ref 0–0.04)
IMM GRANULOCYTES # BLD AUTO: 0.11 K/UL (ref 0–0.04)
IMM GRANULOCYTES # BLD AUTO: 0.13 K/UL (ref 0–0.04)
IMM GRANULOCYTES NFR BLD AUTO: 0.6 % (ref 0–0.5)
IMM GRANULOCYTES NFR BLD AUTO: 0.7 % (ref 0–0.5)
IMM GRANULOCYTES NFR BLD AUTO: 0.7 % (ref 0–0.5)
IMM GRANULOCYTES NFR BLD AUTO: 0.9 % (ref 0–0.5)
IMM GRANULOCYTES NFR BLD AUTO: 1.1 % (ref 0–0.5)
IMM GRANULOCYTES NFR BLD AUTO: 1.5 % (ref 0–0.5)
IRON SERPL-MCNC: 24 UG/DL (ref 30–160)
LACTATE SERPL-SCNC: 1.2 MMOL/L (ref 0.5–1.9)
LDH SERPL L TO P-CCNC: 244 U/L (ref 110–260)
LYMPHOCYTES # BLD AUTO: 0.4 K/UL (ref 1–4.8)
LYMPHOCYTES # BLD AUTO: 0.5 K/UL (ref 1–4.8)
LYMPHOCYTES # BLD AUTO: 0.6 K/UL (ref 1–4.8)
LYMPHOCYTES # BLD AUTO: 0.7 K/UL (ref 1–4.8)
LYMPHOCYTES # BLD AUTO: 0.8 K/UL (ref 1–4.8)
LYMPHOCYTES # BLD AUTO: 1.1 K/UL (ref 1–4.8)
LYMPHOCYTES NFR BLD: 10 % (ref 18–48)
LYMPHOCYTES NFR BLD: 4 % (ref 18–48)
LYMPHOCYTES NFR BLD: 5.5 % (ref 18–48)
LYMPHOCYTES NFR BLD: 5.8 % (ref 18–48)
LYMPHOCYTES NFR BLD: 6.1 % (ref 18–48)
LYMPHOCYTES NFR BLD: 6.3 % (ref 18–48)
LYMPHOCYTES NFR BLD: 7.8 % (ref 18–48)
LYMPHOCYTES NFR BLD: 8.1 % (ref 18–48)
MAGNESIUM SERPL-MCNC: 2 MG/DL (ref 1.6–2.6)
MAGNESIUM SERPL-MCNC: 2.1 MG/DL (ref 1.6–2.6)
MAGNESIUM SERPL-MCNC: 2.1 MG/DL (ref 1.6–2.6)
MAGNESIUM SERPL-MCNC: 2.2 MG/DL (ref 1.6–2.6)
MAGNESIUM SERPL-MCNC: 2.3 MG/DL (ref 1.6–2.6)
MAGNESIUM SERPL-MCNC: 2.4 MG/DL (ref 1.6–2.6)
MAGNESIUM SERPL-MCNC: 2.5 MG/DL (ref 1.6–2.6)
MCH RBC QN AUTO: 27.1 PG (ref 27–31)
MCH RBC QN AUTO: 27.4 PG (ref 27–31)
MCH RBC QN AUTO: 27.4 PG (ref 27–31)
MCH RBC QN AUTO: 27.5 PG (ref 27–31)
MCH RBC QN AUTO: 27.6 PG (ref 27–31)
MCH RBC QN AUTO: 27.6 PG (ref 27–31)
MCH RBC QN AUTO: 27.9 PG (ref 27–31)
MCH RBC QN AUTO: 28.1 PG (ref 27–31)
MCHC RBC AUTO-ENTMCNC: 31.3 G/DL (ref 32–36)
MCHC RBC AUTO-ENTMCNC: 31.6 G/DL (ref 32–36)
MCHC RBC AUTO-ENTMCNC: 31.7 G/DL (ref 32–36)
MCHC RBC AUTO-ENTMCNC: 32 G/DL (ref 32–36)
MCHC RBC AUTO-ENTMCNC: 32.1 G/DL (ref 32–36)
MCHC RBC AUTO-ENTMCNC: 32.1 G/DL (ref 32–36)
MCHC RBC AUTO-ENTMCNC: 32.4 G/DL (ref 32–36)
MCHC RBC AUTO-ENTMCNC: 33 G/DL (ref 32–36)
MCV RBC AUTO: 84 FL (ref 82–98)
MCV RBC AUTO: 85 FL (ref 82–98)
MCV RBC AUTO: 86 FL (ref 82–98)
MCV RBC AUTO: 87 FL (ref 82–98)
MCV RBC AUTO: 90 FL (ref 82–98)
MODE: ABNORMAL
MONOCYTES # BLD AUTO: 0.2 K/UL (ref 0.3–1)
MONOCYTES # BLD AUTO: 0.3 K/UL (ref 0.3–1)
MONOCYTES # BLD AUTO: 0.5 K/UL (ref 0.3–1)
MONOCYTES # BLD AUTO: 0.6 K/UL (ref 0.3–1)
MONOCYTES # BLD AUTO: 0.7 K/UL (ref 0.3–1)
MONOCYTES NFR BLD: 1.7 % (ref 4–15)
MONOCYTES NFR BLD: 1.8 % (ref 4–15)
MONOCYTES NFR BLD: 2.9 % (ref 4–15)
MONOCYTES NFR BLD: 3 % (ref 4–15)
MONOCYTES NFR BLD: 3.3 % (ref 4–15)
MONOCYTES NFR BLD: 4.3 % (ref 4–15)
MONOCYTES NFR BLD: 5.8 % (ref 4–15)
MONOCYTES NFR BLD: 7 % (ref 4–15)
NEUTROPHILS # BLD AUTO: 10.6 K/UL (ref 1.8–7.7)
NEUTROPHILS # BLD AUTO: 6.7 K/UL (ref 1.8–7.7)
NEUTROPHILS # BLD AUTO: 6.9 K/UL (ref 1.8–7.7)
NEUTROPHILS # BLD AUTO: 7.1 K/UL (ref 1.8–7.7)
NEUTROPHILS # BLD AUTO: 7.6 K/UL (ref 1.8–7.7)
NEUTROPHILS # BLD AUTO: 7.9 K/UL (ref 1.8–7.7)
NEUTROPHILS # BLD AUTO: 8.1 K/UL (ref 1.8–7.7)
NEUTROPHILS # BLD AUTO: 9.1 K/UL (ref 1.8–7.7)
NEUTROPHILS NFR BLD: 84.1 % (ref 38–73)
NEUTROPHILS NFR BLD: 84.9 % (ref 38–73)
NEUTROPHILS NFR BLD: 87 % (ref 38–73)
NEUTROPHILS NFR BLD: 88.2 % (ref 38–73)
NEUTROPHILS NFR BLD: 89.9 % (ref 38–73)
NEUTROPHILS NFR BLD: 90.4 % (ref 38–73)
NEUTROPHILS NFR BLD: 91.5 % (ref 38–73)
NEUTROPHILS NFR BLD: 92.5 % (ref 38–73)
NRBC BLD-RTO: 0 /100 WBC
NUM UNITS TRANS PACKED RBC: NORMAL
OB PNL STL: NEGATIVE
OB PNL STL: POSITIVE
PCO2 BLDA: 31.9 MMHG (ref 35–45)
PH SMN: 7.38 [PH] (ref 7.35–7.45)
PLATELET # BLD AUTO: 217 K/UL (ref 150–450)
PLATELET # BLD AUTO: 218 K/UL (ref 150–450)
PLATELET # BLD AUTO: 219 K/UL (ref 150–450)
PLATELET # BLD AUTO: 239 K/UL (ref 150–450)
PLATELET # BLD AUTO: 248 K/UL (ref 150–450)
PLATELET # BLD AUTO: 275 K/UL (ref 150–450)
PLATELET # BLD AUTO: 279 K/UL (ref 150–450)
PLATELET # BLD AUTO: 321 K/UL (ref 150–450)
PMV BLD AUTO: 10.1 FL (ref 9.2–12.9)
PMV BLD AUTO: 10.2 FL (ref 9.2–12.9)
PMV BLD AUTO: 10.3 FL (ref 9.2–12.9)
PMV BLD AUTO: 10.3 FL (ref 9.2–12.9)
PMV BLD AUTO: 10.4 FL (ref 9.2–12.9)
PMV BLD AUTO: 10.5 FL (ref 9.2–12.9)
PMV BLD AUTO: 10.6 FL (ref 9.2–12.9)
PMV BLD AUTO: 11.2 FL (ref 9.2–12.9)
PO2 BLDA: 57 MMHG (ref 80–100)
POC BE: -6 MMOL/L
POC SATURATED O2: 89 % (ref 95–100)
POC TCO2: 20 MMOL/L (ref 23–27)
POTASSIUM SERPL-SCNC: 3.5 MMOL/L (ref 3.5–5.1)
POTASSIUM SERPL-SCNC: 3.5 MMOL/L (ref 3.5–5.1)
POTASSIUM SERPL-SCNC: 3.9 MMOL/L (ref 3.5–5.1)
POTASSIUM SERPL-SCNC: 4.2 MMOL/L (ref 3.5–5.1)
POTASSIUM SERPL-SCNC: 4.3 MMOL/L (ref 3.5–5.1)
POTASSIUM SERPL-SCNC: 4.6 MMOL/L (ref 3.5–5.1)
PROCALCITONIN SERPL IA-MCNC: <0.05 NG/ML (ref 0–0.5)
PROT SERPL-MCNC: 6.5 G/DL (ref 6–8.4)
PROT SERPL-MCNC: 6.6 G/DL (ref 6–8.4)
PROT SERPL-MCNC: 6.7 G/DL (ref 6–8.4)
PROT SERPL-MCNC: 6.7 G/DL (ref 6–8.4)
PROT SERPL-MCNC: 6.8 G/DL (ref 6–8.4)
PROT SERPL-MCNC: 6.8 G/DL (ref 6–8.4)
PROT SERPL-MCNC: 7 G/DL (ref 6–8.4)
PROT SERPL-MCNC: 7.1 G/DL (ref 6–8.4)
RBC # BLD AUTO: 2.7 M/UL (ref 4–5.4)
RBC # BLD AUTO: 3.61 M/UL (ref 4–5.4)
RBC # BLD AUTO: 3.61 M/UL (ref 4–5.4)
RBC # BLD AUTO: 3.66 M/UL (ref 4–5.4)
RBC # BLD AUTO: 3.68 M/UL (ref 4–5.4)
RBC # BLD AUTO: 4.16 M/UL (ref 4–5.4)
RBC # BLD AUTO: 4.27 M/UL (ref 4–5.4)
RBC # BLD AUTO: 4.29 M/UL (ref 4–5.4)
SAMPLE: ABNORMAL
SARS-COV-2 RDRP RESP QL NAA+PROBE: POSITIVE
SATURATED IRON: 9 % (ref 20–50)
SITE: ABNORMAL
SODIUM SERPL-SCNC: 128 MMOL/L (ref 136–145)
SODIUM SERPL-SCNC: 130 MMOL/L (ref 136–145)
SODIUM SERPL-SCNC: 131 MMOL/L (ref 136–145)
SODIUM SERPL-SCNC: 131 MMOL/L (ref 136–145)
SODIUM SERPL-SCNC: 132 MMOL/L (ref 136–145)
SODIUM SERPL-SCNC: 133 MMOL/L (ref 136–145)
SODIUM SERPL-SCNC: 134 MMOL/L (ref 136–145)
SODIUM SERPL-SCNC: 136 MMOL/L (ref 136–145)
SP02: 94
TOTAL IRON BINDING CAPACITY: 255 UG/DL (ref 250–450)
TRANSFERRIN SERPL-MCNC: 182 MG/DL (ref 200–375)
TROPONIN I SERPL DL<=0.01 NG/ML-MCNC: <0.03 NG/ML
WBC # BLD AUTO: 10.72 K/UL (ref 3.9–12.7)
WBC # BLD AUTO: 12.17 K/UL (ref 3.9–12.7)
WBC # BLD AUTO: 7.58 K/UL (ref 3.9–12.7)
WBC # BLD AUTO: 7.83 K/UL (ref 3.9–12.7)
WBC # BLD AUTO: 8.24 K/UL (ref 3.9–12.7)
WBC # BLD AUTO: 8.35 K/UL (ref 3.9–12.7)
WBC # BLD AUTO: 8.72 K/UL (ref 3.9–12.7)
WBC # BLD AUTO: 8.8 K/UL (ref 3.9–12.7)

## 2022-01-01 PROCEDURE — 25000003 PHARM REV CODE 250

## 2022-01-01 PROCEDURE — 63600175 PHARM REV CODE 636 W HCPCS: Performed by: EMERGENCY MEDICINE

## 2022-01-01 PROCEDURE — 97530 THERAPEUTIC ACTIVITIES: CPT

## 2022-01-01 PROCEDURE — 25000003 PHARM REV CODE 250: Performed by: NURSE PRACTITIONER

## 2022-01-01 PROCEDURE — 94640 AIRWAY INHALATION TREATMENT: CPT

## 2022-01-01 PROCEDURE — 99291 CRITICAL CARE FIRST HOUR: CPT

## 2022-01-01 PROCEDURE — 99233 SBSQ HOSP IP/OBS HIGH 50: CPT | Mod: ,,, | Performed by: INTERNAL MEDICINE

## 2022-01-01 PROCEDURE — 97116 GAIT TRAINING THERAPY: CPT

## 2022-01-01 PROCEDURE — 36415 COLL VENOUS BLD VENIPUNCTURE: CPT | Performed by: INTERNAL MEDICINE

## 2022-01-01 PROCEDURE — 97110 THERAPEUTIC EXERCISES: CPT

## 2022-01-01 PROCEDURE — 82728 ASSAY OF FERRITIN: CPT | Performed by: INTERNAL MEDICINE

## 2022-01-01 PROCEDURE — 85025 COMPLETE CBC W/AUTO DIFF WBC: CPT | Performed by: NURSE PRACTITIONER

## 2022-01-01 PROCEDURE — 63600175 PHARM REV CODE 636 W HCPCS: Performed by: NURSE PRACTITIONER

## 2022-01-01 PROCEDURE — 82272 OCCULT BLD FECES 1-3 TESTS: CPT | Performed by: EMERGENCY MEDICINE

## 2022-01-01 PROCEDURE — 84466 ASSAY OF TRANSFERRIN: CPT | Performed by: EMERGENCY MEDICINE

## 2022-01-01 PROCEDURE — 99900031 HC PATIENT EDUCATION (STAT)

## 2022-01-01 PROCEDURE — 63600175 PHARM REV CODE 636 W HCPCS

## 2022-01-01 PROCEDURE — 82962 GLUCOSE BLOOD TEST: CPT

## 2022-01-01 PROCEDURE — 86850 RBC ANTIBODY SCREEN: CPT | Performed by: EMERGENCY MEDICINE

## 2022-01-01 PROCEDURE — 80053 COMPREHEN METABOLIC PANEL: CPT | Performed by: NURSE PRACTITIONER

## 2022-01-01 PROCEDURE — 99233 PR SUBSEQUENT HOSPITAL CARE,LEVL III: ICD-10-PCS | Mod: ,,, | Performed by: INTERNAL MEDICINE

## 2022-01-01 PROCEDURE — 94799 UNLISTED PULMONARY SVC/PX: CPT

## 2022-01-01 PROCEDURE — 84145 PROCALCITONIN (PCT): CPT | Performed by: EMERGENCY MEDICINE

## 2022-01-01 PROCEDURE — 94761 N-INVAS EAR/PLS OXIMETRY MLT: CPT

## 2022-01-01 PROCEDURE — 25000003 PHARM REV CODE 250: Performed by: INTERNAL MEDICINE

## 2022-01-01 PROCEDURE — 85379 FIBRIN DEGRADATION QUANT: CPT | Performed by: EMERGENCY MEDICINE

## 2022-01-01 PROCEDURE — 36415 COLL VENOUS BLD VENIPUNCTURE: CPT | Performed by: NURSE PRACTITIONER

## 2022-01-01 PROCEDURE — 96374 THER/PROPH/DIAG INJ IV PUSH: CPT

## 2022-01-01 PROCEDURE — 83880 ASSAY OF NATRIURETIC PEPTIDE: CPT | Performed by: EMERGENCY MEDICINE

## 2022-01-01 PROCEDURE — 85379 FIBRIN DEGRADATION QUANT: CPT | Performed by: INTERNAL MEDICINE

## 2022-01-01 PROCEDURE — 83735 ASSAY OF MAGNESIUM: CPT | Performed by: NURSE PRACTITIONER

## 2022-01-01 PROCEDURE — 86140 C-REACTIVE PROTEIN: CPT | Performed by: EMERGENCY MEDICINE

## 2022-01-01 PROCEDURE — U0002 COVID-19 LAB TEST NON-CDC: HCPCS | Performed by: EMERGENCY MEDICINE

## 2022-01-01 PROCEDURE — 20000000 HC ICU ROOM

## 2022-01-01 PROCEDURE — 86140 C-REACTIVE PROTEIN: CPT | Performed by: INTERNAL MEDICINE

## 2022-01-01 PROCEDURE — 21000000 HC CCU ICU ROOM CHARGE

## 2022-01-01 PROCEDURE — 63600175 PHARM REV CODE 636 W HCPCS: Performed by: HOSPITALIST

## 2022-01-01 PROCEDURE — 83615 LACTATE (LD) (LDH) ENZYME: CPT | Performed by: EMERGENCY MEDICINE

## 2022-01-01 PROCEDURE — P9016 RBC LEUKOCYTES REDUCED: HCPCS | Performed by: NURSE PRACTITIONER

## 2022-01-01 PROCEDURE — 82272 OCCULT BLD FECES 1-3 TESTS: CPT | Performed by: NURSE PRACTITIONER

## 2022-01-01 PROCEDURE — 84484 ASSAY OF TROPONIN QUANT: CPT | Performed by: EMERGENCY MEDICINE

## 2022-01-01 PROCEDURE — 99900035 HC TECH TIME PER 15 MIN (STAT)

## 2022-01-01 PROCEDURE — 83605 ASSAY OF LACTIC ACID: CPT | Performed by: EMERGENCY MEDICINE

## 2022-01-01 PROCEDURE — 86920 COMPATIBILITY TEST SPIN: CPT | Performed by: NURSE PRACTITIONER

## 2022-01-01 PROCEDURE — 97535 SELF CARE MNGMENT TRAINING: CPT

## 2022-01-01 PROCEDURE — 93010 ELECTROCARDIOGRAM REPORT: CPT | Mod: ,,, | Performed by: INTERNAL MEDICINE

## 2022-01-01 PROCEDURE — 27000221 HC OXYGEN, UP TO 24 HOURS

## 2022-01-01 PROCEDURE — 97161 PT EVAL LOW COMPLEX 20 MIN: CPT

## 2022-01-01 PROCEDURE — 87040 BLOOD CULTURE FOR BACTERIA: CPT | Mod: 59 | Performed by: EMERGENCY MEDICINE

## 2022-01-01 PROCEDURE — 25000242 PHARM REV CODE 250 ALT 637 W/ HCPCS: Performed by: EMERGENCY MEDICINE

## 2022-01-01 PROCEDURE — 63600175 PHARM REV CODE 636 W HCPCS: Performed by: INTERNAL MEDICINE

## 2022-01-01 PROCEDURE — 93010 EKG 12-LEAD: ICD-10-PCS | Mod: ,,, | Performed by: INTERNAL MEDICINE

## 2022-01-01 PROCEDURE — 82728 ASSAY OF FERRITIN: CPT | Performed by: EMERGENCY MEDICINE

## 2022-01-01 PROCEDURE — 80053 COMPREHEN METABOLIC PANEL: CPT | Performed by: EMERGENCY MEDICINE

## 2022-01-01 PROCEDURE — 99223 1ST HOSP IP/OBS HIGH 75: CPT | Mod: ,,, | Performed by: INTERNAL MEDICINE

## 2022-01-01 PROCEDURE — 94660 CPAP INITIATION&MGMT: CPT

## 2022-01-01 PROCEDURE — 93005 ELECTROCARDIOGRAM TRACING: CPT | Performed by: INTERNAL MEDICINE

## 2022-01-01 PROCEDURE — 97165 OT EVAL LOW COMPLEX 30 MIN: CPT

## 2022-01-01 PROCEDURE — 25000242 PHARM REV CODE 250 ALT 637 W/ HCPCS: Performed by: NURSE PRACTITIONER

## 2022-01-01 PROCEDURE — 27100171 HC OXYGEN HIGH FLOW UP TO 24 HOURS

## 2022-01-01 PROCEDURE — 99223 PR INITIAL HOSPITAL CARE,LEVL III: ICD-10-PCS | Mod: ,,, | Performed by: INTERNAL MEDICINE

## 2022-01-01 PROCEDURE — 82803 BLOOD GASES ANY COMBINATION: CPT

## 2022-01-01 PROCEDURE — 36600 WITHDRAWAL OF ARTERIAL BLOOD: CPT

## 2022-01-01 PROCEDURE — 85025 COMPLETE CBC W/AUTO DIFF WBC: CPT | Performed by: EMERGENCY MEDICINE

## 2022-01-01 PROCEDURE — 36415 COLL VENOUS BLD VENIPUNCTURE: CPT | Performed by: EMERGENCY MEDICINE

## 2022-01-01 PROCEDURE — 82550 ASSAY OF CK (CPK): CPT | Performed by: EMERGENCY MEDICINE

## 2022-01-01 RX ORDER — CHLORHEXIDINE GLUCONATE ORAL RINSE 1.2 MG/ML
15 SOLUTION DENTAL 2 TIMES DAILY
Status: DISCONTINUED | OUTPATIENT
Start: 2022-01-01 | End: 2022-01-01

## 2022-01-01 RX ORDER — MORPHINE SULFATE 4 MG/ML
4 INJECTION, SOLUTION INTRAMUSCULAR; INTRAVENOUS ONCE
Status: DISCONTINUED | OUTPATIENT
Start: 2022-01-01 | End: 2022-01-01 | Stop reason: HOSPADM

## 2022-01-01 RX ORDER — POTASSIUM CHLORIDE 7.45 MG/ML
80 INJECTION INTRAVENOUS
Status: DISCONTINUED | OUTPATIENT
Start: 2022-01-01 | End: 2022-01-01

## 2022-01-01 RX ORDER — SODIUM CHLORIDE 0.9 % (FLUSH) 0.9 %
10 SYRINGE (ML) INJECTION
Status: DISCONTINUED | OUTPATIENT
Start: 2022-01-01 | End: 2022-01-01

## 2022-01-01 RX ORDER — DEXAMETHASONE 4 MG/1
4 TABLET ORAL DAILY
Status: ON HOLD | COMMUNITY
Start: 2022-01-01 | End: 2022-01-01 | Stop reason: HOSPADM

## 2022-01-01 RX ORDER — ALBUTEROL SULFATE 90 UG/1
2 AEROSOL, METERED RESPIRATORY (INHALATION) EVERY 4 HOURS PRN
Status: DISCONTINUED | OUTPATIENT
Start: 2022-01-01 | End: 2022-01-01 | Stop reason: HOSPADM

## 2022-01-01 RX ORDER — LORAZEPAM 2 MG/ML
2 INJECTION INTRAMUSCULAR
Status: DISCONTINUED | OUTPATIENT
Start: 2022-01-01 | End: 2022-01-01

## 2022-01-01 RX ORDER — DONEPEZIL HYDROCHLORIDE 10 MG/1
10 TABLET, FILM COATED ORAL NIGHTLY
Status: ON HOLD | COMMUNITY
Start: 2021-01-01 | End: 2022-01-01 | Stop reason: HOSPADM

## 2022-01-01 RX ORDER — INSULIN ASPART 100 [IU]/ML
0-5 INJECTION, SOLUTION INTRAVENOUS; SUBCUTANEOUS
Status: DISCONTINUED | OUTPATIENT
Start: 2022-01-01 | End: 2022-01-01

## 2022-01-01 RX ORDER — METFORMIN HYDROCHLORIDE 500 MG/1
500 TABLET ORAL
Status: ON HOLD | COMMUNITY
Start: 2021-01-01 | End: 2022-01-01 | Stop reason: HOSPADM

## 2022-01-01 RX ORDER — ENOXAPARIN SODIUM 100 MG/ML
40 INJECTION SUBCUTANEOUS EVERY 24 HOURS
Status: DISCONTINUED | OUTPATIENT
Start: 2022-01-01 | End: 2022-01-01

## 2022-01-01 RX ORDER — GABAPENTIN 100 MG/1
100 CAPSULE ORAL 2 TIMES DAILY
Status: DISCONTINUED | OUTPATIENT
Start: 2022-01-01 | End: 2022-01-01

## 2022-01-01 RX ORDER — MUPIROCIN 20 MG/G
OINTMENT TOPICAL 2 TIMES DAILY
Status: DISCONTINUED | OUTPATIENT
Start: 2022-01-01 | End: 2022-01-01

## 2022-01-01 RX ORDER — POTASSIUM CHLORIDE 7.45 MG/ML
60 INJECTION INTRAVENOUS
Status: DISCONTINUED | OUTPATIENT
Start: 2022-01-01 | End: 2022-01-01

## 2022-01-01 RX ORDER — LANOLIN ALCOHOL/MO/W.PET/CERES
800 CREAM (GRAM) TOPICAL
Status: DISCONTINUED | OUTPATIENT
Start: 2022-01-01 | End: 2022-01-01

## 2022-01-01 RX ORDER — CLONAZEPAM 0.5 MG/1
0.5 TABLET ORAL 2 TIMES DAILY PRN
Status: DISCONTINUED | OUTPATIENT
Start: 2022-01-01 | End: 2022-01-01

## 2022-01-01 RX ORDER — METOPROLOL TARTRATE 25 MG/1
25 TABLET, FILM COATED ORAL 2 TIMES DAILY
Status: ON HOLD | COMMUNITY
Start: 2021-01-01 | End: 2022-01-01 | Stop reason: HOSPADM

## 2022-01-01 RX ORDER — ACETAMINOPHEN 325 MG/1
650 TABLET ORAL EVERY 4 HOURS PRN
Status: DISCONTINUED | OUTPATIENT
Start: 2022-01-01 | End: 2022-01-01 | Stop reason: HOSPADM

## 2022-01-01 RX ORDER — ALBUTEROL SULFATE 90 UG/1
2 AEROSOL, METERED RESPIRATORY (INHALATION) ONCE
Status: COMPLETED | OUTPATIENT
Start: 2022-01-01 | End: 2022-01-01

## 2022-01-01 RX ORDER — MEMANTINE HYDROCHLORIDE 5 MG/1
10 TABLET ORAL DAILY
Status: DISCONTINUED | OUTPATIENT
Start: 2022-01-01 | End: 2022-01-01

## 2022-01-01 RX ORDER — HYDROCODONE BITARTRATE AND ACETAMINOPHEN 500; 5 MG/1; MG/1
TABLET ORAL
Status: DISCONTINUED | OUTPATIENT
Start: 2022-01-01 | End: 2022-01-01

## 2022-01-01 RX ORDER — SODIUM,POTASSIUM PHOSPHATES 280-250MG
2 POWDER IN PACKET (EA) ORAL
Status: DISCONTINUED | OUTPATIENT
Start: 2022-01-01 | End: 2022-01-01

## 2022-01-01 RX ORDER — IBUPROFEN 200 MG
16 TABLET ORAL
Status: DISCONTINUED | OUTPATIENT
Start: 2022-01-01 | End: 2022-01-01

## 2022-01-01 RX ORDER — POTASSIUM CHLORIDE 7.45 MG/ML
40 INJECTION INTRAVENOUS
Status: DISCONTINUED | OUTPATIENT
Start: 2022-01-01 | End: 2022-01-01

## 2022-01-01 RX ORDER — GEMFIBROZIL 600 MG/1
600 TABLET, FILM COATED ORAL
Status: ON HOLD | COMMUNITY
End: 2022-01-01 | Stop reason: HOSPADM

## 2022-01-01 RX ORDER — POTASSIUM CHLORIDE 1.5 G/1.58G
40 POWDER, FOR SOLUTION ORAL ONCE
Status: COMPLETED | OUTPATIENT
Start: 2022-01-01 | End: 2022-01-01

## 2022-01-01 RX ORDER — CHOLECALCIFEROL (VITAMIN D3) 50 MCG
1 TABLET ORAL DAILY
Status: DISCONTINUED | OUTPATIENT
Start: 2022-01-01 | End: 2022-01-01

## 2022-01-01 RX ORDER — ENOXAPARIN SODIUM 100 MG/ML
30 INJECTION SUBCUTANEOUS
Status: DISCONTINUED | OUTPATIENT
Start: 2022-01-01 | End: 2022-01-01

## 2022-01-01 RX ORDER — AMLODIPINE BESYLATE 10 MG/1
10 TABLET ORAL DAILY
Status: ON HOLD | COMMUNITY
Start: 2021-01-01 | End: 2022-01-01 | Stop reason: HOSPADM

## 2022-01-01 RX ORDER — MORPHINE SULFATE 2 MG/ML
2 INJECTION, SOLUTION INTRAMUSCULAR; INTRAVENOUS EVERY 30 MIN PRN
Status: DISCONTINUED | OUTPATIENT
Start: 2022-01-01 | End: 2022-01-01

## 2022-01-01 RX ORDER — TRAMADOL HYDROCHLORIDE 50 MG/1
50 TABLET ORAL 2 TIMES DAILY PRN
Status: DISCONTINUED | OUTPATIENT
Start: 2022-01-01 | End: 2022-01-01

## 2022-01-01 RX ORDER — MORPHINE SULFATE 4 MG/ML
4 INJECTION, SOLUTION INTRAMUSCULAR; INTRAVENOUS
Status: DISCONTINUED | OUTPATIENT
Start: 2022-01-01 | End: 2022-01-01 | Stop reason: HOSPADM

## 2022-01-01 RX ORDER — MAGNESIUM SULFATE HEPTAHYDRATE 40 MG/ML
2 INJECTION, SOLUTION INTRAVENOUS
Status: DISCONTINUED | OUTPATIENT
Start: 2022-01-01 | End: 2022-01-01

## 2022-01-01 RX ORDER — METOPROLOL TARTRATE 25 MG/1
25 TABLET, FILM COATED ORAL 2 TIMES DAILY
Status: DISCONTINUED | OUTPATIENT
Start: 2022-01-01 | End: 2022-01-01 | Stop reason: HOSPADM

## 2022-01-01 RX ORDER — FAMOTIDINE 40 MG/5ML
10 POWDER, FOR SUSPENSION ORAL NIGHTLY
Status: DISCONTINUED | OUTPATIENT
Start: 2022-01-01 | End: 2022-01-01

## 2022-01-01 RX ORDER — NALOXONE HCL 0.4 MG/ML
0.02 VIAL (ML) INJECTION
Status: DISCONTINUED | OUTPATIENT
Start: 2022-01-01 | End: 2022-01-01 | Stop reason: HOSPADM

## 2022-01-01 RX ORDER — DONEPEZIL HYDROCHLORIDE 5 MG/1
10 TABLET, FILM COATED ORAL NIGHTLY
Status: DISCONTINUED | OUTPATIENT
Start: 2022-01-01 | End: 2022-01-01

## 2022-01-01 RX ORDER — FOLIC ACID 1 MG/1
1 TABLET ORAL DAILY
Status: DISCONTINUED | OUTPATIENT
Start: 2022-01-01 | End: 2022-01-01

## 2022-01-01 RX ORDER — LEVOFLOXACIN 750 MG/1
750 TABLET ORAL DAILY
Status: ON HOLD | COMMUNITY
Start: 2022-01-01 | End: 2022-01-01 | Stop reason: HOSPADM

## 2022-01-01 RX ORDER — ATORVASTATIN CALCIUM 20 MG/1
20 TABLET, FILM COATED ORAL DAILY
Status: DISCONTINUED | OUTPATIENT
Start: 2022-01-01 | End: 2022-01-01

## 2022-01-01 RX ORDER — DEXAMETHASONE SODIUM PHOSPHATE 4 MG/ML
6 INJECTION, SOLUTION INTRA-ARTICULAR; INTRALESIONAL; INTRAMUSCULAR; INTRAVENOUS; SOFT TISSUE DAILY
Status: DISCONTINUED | OUTPATIENT
Start: 2022-01-01 | End: 2022-01-01

## 2022-01-01 RX ORDER — DEXAMETHASONE SODIUM PHOSPHATE 4 MG/ML
6 INJECTION, SOLUTION INTRA-ARTICULAR; INTRALESIONAL; INTRAMUSCULAR; INTRAVENOUS; SOFT TISSUE
Status: COMPLETED | OUTPATIENT
Start: 2022-01-01 | End: 2022-01-01

## 2022-01-01 RX ORDER — DOCUSATE SODIUM 100 MG/1
100 CAPSULE, LIQUID FILLED ORAL 2 TIMES DAILY
Status: DISCONTINUED | OUTPATIENT
Start: 2022-01-01 | End: 2022-01-01

## 2022-01-01 RX ORDER — GLUCAGON 1 MG
1 KIT INJECTION
Status: DISCONTINUED | OUTPATIENT
Start: 2022-01-01 | End: 2022-01-01

## 2022-01-01 RX ORDER — TORSEMIDE 10 MG/1
1 TABLET ORAL DAILY
Status: ON HOLD | COMMUNITY
End: 2022-01-01 | Stop reason: HOSPADM

## 2022-01-01 RX ORDER — TALC
6 POWDER (GRAM) TOPICAL NIGHTLY PRN
Status: DISCONTINUED | OUTPATIENT
Start: 2022-01-01 | End: 2022-01-01

## 2022-01-01 RX ORDER — BENZONATATE 100 MG/1
200 CAPSULE ORAL 3 TIMES DAILY PRN
Status: DISCONTINUED | OUTPATIENT
Start: 2022-01-01 | End: 2022-01-01 | Stop reason: HOSPADM

## 2022-01-01 RX ORDER — EZETIMIBE 10 MG/1
10 TABLET ORAL DAILY
Status: DISCONTINUED | OUTPATIENT
Start: 2022-01-01 | End: 2022-01-01

## 2022-01-01 RX ORDER — ESCITALOPRAM OXALATE 10 MG/1
20 TABLET ORAL DAILY
Status: DISCONTINUED | OUTPATIENT
Start: 2022-01-01 | End: 2022-01-01

## 2022-01-01 RX ORDER — LORAZEPAM 2 MG/ML
1 INJECTION INTRAMUSCULAR
Status: DISCONTINUED | OUTPATIENT
Start: 2022-01-01 | End: 2022-01-01 | Stop reason: HOSPADM

## 2022-01-01 RX ORDER — FUROSEMIDE 10 MG/ML
40 INJECTION INTRAMUSCULAR; INTRAVENOUS ONCE
Status: COMPLETED | OUTPATIENT
Start: 2022-01-01 | End: 2022-01-01

## 2022-01-01 RX ORDER — MORPHINE SULFATE 4 MG/ML
4 INJECTION, SOLUTION INTRAMUSCULAR; INTRAVENOUS EVERY 30 MIN PRN
Status: DISCONTINUED | OUTPATIENT
Start: 2022-01-01 | End: 2022-01-01

## 2022-01-01 RX ORDER — EZETIMIBE 10 MG/1
10 TABLET ORAL DAILY
Status: ON HOLD | COMMUNITY
Start: 2021-01-01 | End: 2022-01-01 | Stop reason: HOSPADM

## 2022-01-01 RX ORDER — AMLODIPINE BESYLATE 5 MG/1
10 TABLET ORAL DAILY
Status: DISCONTINUED | OUTPATIENT
Start: 2022-01-01 | End: 2022-01-01

## 2022-01-01 RX ORDER — MEMANTINE HYDROCHLORIDE 10 MG/1
10 TABLET ORAL 2 TIMES DAILY
Status: ON HOLD | COMMUNITY
Start: 2021-01-01 | End: 2022-01-01 | Stop reason: HOSPADM

## 2022-01-01 RX ORDER — LEVOTHYROXINE SODIUM 88 UG/1
88 TABLET ORAL
Status: DISCONTINUED | OUTPATIENT
Start: 2022-01-01 | End: 2022-01-01 | Stop reason: HOSPADM

## 2022-01-01 RX ORDER — IBUPROFEN 200 MG
24 TABLET ORAL
Status: DISCONTINUED | OUTPATIENT
Start: 2022-01-01 | End: 2022-01-01

## 2022-01-01 RX ORDER — ROSUVASTATIN CALCIUM 5 MG/1
5 TABLET, COATED ORAL DAILY
Status: ON HOLD | COMMUNITY
Start: 2021-01-01 | End: 2022-01-01 | Stop reason: HOSPADM

## 2022-01-01 RX ORDER — ONDANSETRON 2 MG/ML
4 INJECTION INTRAMUSCULAR; INTRAVENOUS EVERY 6 HOURS PRN
Status: DISCONTINUED | OUTPATIENT
Start: 2022-01-01 | End: 2022-01-01 | Stop reason: HOSPADM

## 2022-01-01 RX ORDER — MAGNESIUM SULFATE HEPTAHYDRATE 40 MG/ML
4 INJECTION, SOLUTION INTRAVENOUS
Status: DISCONTINUED | OUTPATIENT
Start: 2022-01-01 | End: 2022-01-01

## 2022-01-01 RX ORDER — FAMOTIDINE 20 MG/1
20 TABLET, FILM COATED ORAL 2 TIMES DAILY
Status: DISCONTINUED | OUTPATIENT
Start: 2022-01-01 | End: 2022-01-01

## 2022-01-01 RX ADMIN — MUPIROCIN: 20 OINTMENT TOPICAL at 09:01

## 2022-01-01 RX ADMIN — EZETIMIBE 10 MG: 10 TABLET ORAL at 08:01

## 2022-01-01 RX ADMIN — LEVOTHYROXINE SODIUM 88 MCG: 88 TABLET ORAL at 06:01

## 2022-01-01 RX ADMIN — METOPROLOL TARTRATE 25 MG: 25 TABLET, FILM COATED ORAL at 09:01

## 2022-01-01 RX ADMIN — ESCITALOPRAM OXALATE 20 MG: 10 TABLET ORAL at 09:01

## 2022-01-01 RX ADMIN — ESCITALOPRAM OXALATE 20 MG: 10 TABLET ORAL at 08:01

## 2022-01-01 RX ADMIN — GABAPENTIN 100 MG: 100 CAPSULE ORAL at 08:01

## 2022-01-01 RX ADMIN — ATORVASTATIN CALCIUM 20 MG: 20 TABLET, FILM COATED ORAL at 09:01

## 2022-01-01 RX ADMIN — DEXAMETHASONE SODIUM PHOSPHATE 6 MG: 4 INJECTION, SOLUTION INTRA-ARTICULAR; INTRALESIONAL; INTRAMUSCULAR; INTRAVENOUS; SOFT TISSUE at 08:01

## 2022-01-01 RX ADMIN — METOPROLOL TARTRATE 25 MG: 25 TABLET, FILM COATED ORAL at 12:01

## 2022-01-01 RX ADMIN — FUROSEMIDE 40 MG: 10 INJECTION, SOLUTION INTRAMUSCULAR; INTRAVENOUS at 08:01

## 2022-01-01 RX ADMIN — LEVOTHYROXINE SODIUM 88 MCG: 88 TABLET ORAL at 05:01

## 2022-01-01 RX ADMIN — DONEPEZIL HYDROCHLORIDE 10 MG: 5 TABLET, FILM COATED ORAL at 07:01

## 2022-01-01 RX ADMIN — GABAPENTIN 100 MG: 100 CAPSULE ORAL at 09:01

## 2022-01-01 RX ADMIN — MUPIROCIN: 20 OINTMENT TOPICAL at 08:01

## 2022-01-01 RX ADMIN — TRAMADOL HYDROCHLORIDE 50 MG: 50 TABLET, COATED ORAL at 07:01

## 2022-01-01 RX ADMIN — ACETAMINOPHEN 650 MG: 325 TABLET, FILM COATED ORAL at 05:01

## 2022-01-01 RX ADMIN — ENOXAPARIN SODIUM 30 MG: 30 INJECTION SUBCUTANEOUS at 04:01

## 2022-01-01 RX ADMIN — ACETAMINOPHEN 650 MG: 325 TABLET, FILM COATED ORAL at 12:01

## 2022-01-01 RX ADMIN — INSULIN ASPART 3 UNITS: 100 INJECTION, SOLUTION INTRAVENOUS; SUBCUTANEOUS at 05:01

## 2022-01-01 RX ADMIN — Medication 2000 UNITS: at 08:01

## 2022-01-01 RX ADMIN — MEMANTINE HYDROCHLORIDE 10 MG: 5 TABLET ORAL at 08:01

## 2022-01-01 RX ADMIN — GABAPENTIN 100 MG: 100 CAPSULE ORAL at 12:01

## 2022-01-01 RX ADMIN — CHLORHEXIDINE GLUCONATE 15 ML: 1.2 RINSE ORAL at 10:01

## 2022-01-01 RX ADMIN — ENOXAPARIN SODIUM 30 MG: 30 INJECTION SUBCUTANEOUS at 05:01

## 2022-01-01 RX ADMIN — FOLIC ACID 1 MG: 1 TABLET ORAL at 08:01

## 2022-01-01 RX ADMIN — TRAMADOL HYDROCHLORIDE 50 MG: 50 TABLET, COATED ORAL at 10:01

## 2022-01-01 RX ADMIN — CLONAZEPAM 0.5 MG: 0.5 TABLET ORAL at 10:01

## 2022-01-01 RX ADMIN — INSULIN ASPART 2 UNITS: 100 INJECTION, SOLUTION INTRAVENOUS; SUBCUTANEOUS at 05:01

## 2022-01-01 RX ADMIN — DOCUSATE SODIUM 100 MG: 100 CAPSULE, LIQUID FILLED ORAL at 08:01

## 2022-01-01 RX ADMIN — TRAMADOL HYDROCHLORIDE 50 MG: 50 TABLET, COATED ORAL at 08:01

## 2022-01-01 RX ADMIN — ACETAMINOPHEN 650 MG: 325 TABLET, FILM COATED ORAL at 03:01

## 2022-01-01 RX ADMIN — AMLODIPINE BESYLATE 10 MG: 5 TABLET ORAL at 08:01

## 2022-01-01 RX ADMIN — METOPROLOL TARTRATE 25 MG: 25 TABLET, FILM COATED ORAL at 08:01

## 2022-01-01 RX ADMIN — ALBUTEROL SULFATE 2 PUFF: 90 AEROSOL, METERED RESPIRATORY (INHALATION) at 08:01

## 2022-01-01 RX ADMIN — ATORVASTATIN CALCIUM 20 MG: 20 TABLET, FILM COATED ORAL at 08:01

## 2022-01-01 RX ADMIN — DOCUSATE SODIUM 100 MG: 100 CAPSULE, LIQUID FILLED ORAL at 09:01

## 2022-01-01 RX ADMIN — ALBUTEROL SULFATE 2 PUFF: 90 AEROSOL, METERED RESPIRATORY (INHALATION) at 04:01

## 2022-01-01 RX ADMIN — Medication 2000 UNITS: at 09:01

## 2022-01-01 RX ADMIN — CLONAZEPAM 0.5 MG: 0.5 TABLET ORAL at 08:01

## 2022-01-01 RX ADMIN — FOLIC ACID 1 MG: 1 TABLET ORAL at 09:01

## 2022-01-01 RX ADMIN — ACETAMINOPHEN 650 MG: 325 TABLET, FILM COATED ORAL at 08:01

## 2022-01-01 RX ADMIN — MELATONIN 6 MG: at 07:01

## 2022-01-01 RX ADMIN — FAMOTIDINE 10 MG: 40 POWDER, FOR SUSPENSION ORAL at 08:01

## 2022-01-01 RX ADMIN — DOCUSATE SODIUM 100 MG: 100 CAPSULE, LIQUID FILLED ORAL at 12:01

## 2022-01-01 RX ADMIN — DEXAMETHASONE SODIUM PHOSPHATE 6 MG: 4 INJECTION, SOLUTION INTRA-ARTICULAR; INTRALESIONAL; INTRAMUSCULAR; INTRAVENOUS; SOFT TISSUE at 09:01

## 2022-01-01 RX ADMIN — DONEPEZIL HYDROCHLORIDE 10 MG: 5 TABLET, FILM COATED ORAL at 09:01

## 2022-01-01 RX ADMIN — DONEPEZIL HYDROCHLORIDE 10 MG: 5 TABLET, FILM COATED ORAL at 08:01

## 2022-01-01 RX ADMIN — METOPROLOL TARTRATE 25 MG: 25 TABLET, FILM COATED ORAL at 07:01

## 2022-01-01 RX ADMIN — CHLORHEXIDINE GLUCONATE 15 ML: 1.2 RINSE ORAL at 08:01

## 2022-01-01 RX ADMIN — FAMOTIDINE 10 MG: 40 POWDER, FOR SUSPENSION ORAL at 07:01

## 2022-01-01 RX ADMIN — FAMOTIDINE 10 MG: 40 POWDER, FOR SUSPENSION ORAL at 09:01

## 2022-01-01 RX ADMIN — EZETIMIBE 10 MG: 10 TABLET ORAL at 09:01

## 2022-01-01 RX ADMIN — LORAZEPAM 1 MG: 2 INJECTION INTRAMUSCULAR; INTRAVENOUS at 04:01

## 2022-01-01 RX ADMIN — DOCUSATE SODIUM 100 MG: 100 CAPSULE, LIQUID FILLED ORAL at 07:01

## 2022-01-01 RX ADMIN — TRAMADOL HYDROCHLORIDE 50 MG: 50 TABLET, COATED ORAL at 12:01

## 2022-01-01 RX ADMIN — AMLODIPINE BESYLATE 10 MG: 5 TABLET ORAL at 09:01

## 2022-01-01 RX ADMIN — CLONAZEPAM 0.5 MG: 0.5 TABLET ORAL at 09:01

## 2022-01-01 RX ADMIN — CHLORHEXIDINE GLUCONATE 15 ML: 1.2 RINSE ORAL at 07:01

## 2022-01-01 RX ADMIN — TRAMADOL HYDROCHLORIDE 50 MG: 50 TABLET, COATED ORAL at 02:01

## 2022-01-01 RX ADMIN — MORPHINE SULFATE 4 MG: 4 INJECTION, SOLUTION INTRAMUSCULAR; INTRAVENOUS at 05:01

## 2022-01-01 RX ADMIN — TRAMADOL HYDROCHLORIDE 50 MG: 50 TABLET, COATED ORAL at 05:01

## 2022-01-01 RX ADMIN — TRAMADOL HYDROCHLORIDE 50 MG: 50 TABLET, COATED ORAL at 09:01

## 2022-01-01 RX ADMIN — MEMANTINE HYDROCHLORIDE 10 MG: 5 TABLET ORAL at 09:01

## 2022-01-01 RX ADMIN — INSULIN ASPART 1 UNITS: 100 INJECTION, SOLUTION INTRAVENOUS; SUBCUTANEOUS at 08:01

## 2022-01-01 RX ADMIN — GABAPENTIN 100 MG: 100 CAPSULE ORAL at 07:01

## 2022-01-01 RX ADMIN — BENZONATATE 200 MG: 100 CAPSULE ORAL at 07:01

## 2022-01-01 RX ADMIN — DEXAMETHASONE SODIUM PHOSPHATE 6 MG: 4 INJECTION, SOLUTION INTRA-ARTICULAR; INTRALESIONAL; INTRAMUSCULAR; INTRAVENOUS; SOFT TISSUE at 03:01

## 2022-01-01 RX ADMIN — POTASSIUM CHLORIDE 40 MEQ: 1.5 POWDER, FOR SOLUTION ORAL at 06:01

## 2022-01-01 RX ADMIN — CHLORHEXIDINE GLUCONATE 15 ML: 1.2 RINSE ORAL at 09:01

## 2022-01-01 RX ADMIN — DONEPEZIL HYDROCHLORIDE 10 MG: 5 TABLET, FILM COATED ORAL at 12:01

## 2022-01-01 RX ADMIN — TRAMADOL HYDROCHLORIDE 50 MG: 50 TABLET, COATED ORAL at 06:01

## 2022-01-01 RX ADMIN — INSULIN ASPART 2 UNITS: 100 INJECTION, SOLUTION INTRAVENOUS; SUBCUTANEOUS at 04:01

## 2022-01-01 RX ADMIN — CLONAZEPAM 0.5 MG: 0.5 TABLET ORAL at 07:01

## 2022-01-01 RX ADMIN — TRAMADOL HYDROCHLORIDE 50 MG: 50 TABLET, COATED ORAL at 03:01

## 2022-01-07 PROBLEM — J96.21 ACUTE ON CHRONIC RESPIRATORY FAILURE WITH HYPOXIA: Status: ACTIVE | Noted: 2022-01-01

## 2022-01-07 NOTE — ED NOTES
81 YOF BIB [facility] c/o SOB. Per report O2 68% on ra when  EMS arrived. Pt currently on non rebreather- O2 97%. Pt speaking in complete sentences. Pt stated +COVID 12/25/2021. Denies any other complaints.     Adult Physical Assessment  LOC: Patient is awake, alert, oriented and speaking appropriately at this time.  APPEARANCE: Patient resting comfortably and appears to be in no acute distress at this time. Patient is clean and well groomed, patient's clothing is properly fastened.  SKIN:The skin is warm and dry, color consistent with ethnicity, patient has normal skin turgor and moist mucus membranes, skin intact, no breakdown or brusing noted.  MUSCULOSKELETAL: Patient moving all extremities well, no obvious swelling or deformities noted.  RESPIRATORY: Airway is open and patent, respirations are spontaneous, patient has a normal effort and rate, no accessory muscle use noted.  CARDIAC: Patient has a normal rate and rhythm, no periphreal edema noted in any extremity, capillary refill < 3 seconds in all extremities.  ABDOMEN: Soft and non tender to palpation, no abdominal distention noted.  NEUROLOGIC: Eyes open spontaneously, behavior appropriate to situation, follows commands, facial expression symmetrical, bilateral hand grasp equal and even, purposeful motor response noted, normal sensation in all extremities when touched with a finger.      All other VSS. ED workup in progress. Call light within pt reach. Safety measures in place: side rails up X2. Will continue to monitor.

## 2022-01-07 NOTE — ED TRIAGE NOTES
Admit per [facility] to ED 13, 80 yo female who was diagnosed with Covid on Christmas 2021, SOB has been progressively worsening. EMS found her with room air pox of 68%. Also c/o pain with inspiration and coughing.

## 2022-01-07 NOTE — ED PROVIDER NOTES
Encounter Date: 1/7/2022       History     Chief Complaint   Patient presents with    Shortness of Breath     Covid Positive 12/25/2021     81-year-old female with history of hypertension, type 1 diabetes hypothyroidism anxiety and arthritis who presents to the ER for increasing shortness of breath.  Patient was diagnosed with COVID on December 25th she had 2 inpatient stays at [facility].  She was discharged home several days ago.  Today her sats were in 68% on RA and she was having a wet cough with yellow sputum, chest tightness with cough.  She was placed on a non-rebreather at 515 liters by EMS sats improved to 97 percent She reports nausea no vomiting diarrhea all night.  Patient's 2 daughters at bedside reports they did not see any blood in stool or black stool but did notice a small amount of red blood in her diaper.  They report she is on 81 milligram was of aspirin but no NSAIDs or anticoagulants.   She has not been weak or dizzy.  She denies headache.                  Review of patient's allergies indicates:   Allergen Reactions    Codeine Shortness Of Breath and Anaphylaxis    Erythromycin      Other reaction(s): Unknown  Pt states she doesn't remember the reaction.    Sulfa (sulfonamide antibiotics) Shortness Of Breath    Atorvastatin calcium Rash    Fenofibrate Rash     Past Medical History:   Diagnosis Date    Anxiety     Arthritis     Diabetes mellitus type I     Hypertension     Thyroid disease      Past Surgical History:   Procedure Laterality Date    CHOLECYSTECTOMY      CYST REMOVAL      ELBOW SURGERY      HIP SURGERY      left    HYSTERECTOMY      SHOULDER SURGERY       History reviewed. No pertinent family history.  Social History     Tobacco Use    Smoking status: Former Smoker    Smokeless tobacco: Never Used   Substance Use Topics    Alcohol use: No     Review of Systems   Constitutional: Positive for activity change and fatigue. Negative for appetite change,  chills, diaphoresis and fever.   HENT: Positive for congestion and rhinorrhea. Negative for postnasal drip and sore throat.    Respiratory: Positive for cough, chest tightness, shortness of breath and wheezing. Negative for stridor.    Cardiovascular: Negative for chest pain and palpitations.   Gastrointestinal: Positive for diarrhea. Negative for abdominal distention, abdominal pain, blood in stool, constipation, nausea and vomiting.   Genitourinary: Negative for dysuria, flank pain, frequency, hematuria and urgency.   Musculoskeletal: Positive for myalgias. Negative for arthralgias, back pain, neck pain and neck stiffness.   Skin: Negative for rash.   Neurological: Positive for weakness. Negative for dizziness, syncope, light-headedness and headaches.   Hematological: Does not bruise/bleed easily.   Psychiatric/Behavioral: Negative for confusion. The patient is not nervous/anxious.    All other systems reviewed and are negative.      Physical Exam     Initial Vitals [01/07/22 1413]   BP Pulse Resp Temp SpO2   133/63 68 20 98.8 °F (37.1 °C) 100 %      MAP       --         Physical Exam    Nursing note and vitals reviewed.  Constitutional: She appears well-developed and well-nourished. She is not diaphoretic. No distress.   HENT:   Head: Normocephalic and atraumatic.   Right Ear: External ear normal.   Left Ear: External ear normal.   Nose: Nose normal.   Mouth/Throat: Oropharynx is clear and moist.   Eyes: Conjunctivae and EOM are normal. Pupils are equal, round, and reactive to light.   Neck: Neck supple. No tracheal deviation present.   Normal range of motion.  Cardiovascular: Normal rate, regular rhythm, normal heart sounds and intact distal pulses. Exam reveals no gallop and no friction rub.    No murmur heard.  Pulmonary/Chest: No stridor. She is in respiratory distress. She has wheezes. She has no rhonchi. She has no rales. She exhibits no tenderness.   Sats are 97 percent on 15 liters non-rebreather.   Patient tells me she does not need heard oxygen and she takes it off.  She is observed desatting to 86 percent.  She reports that she feels short of breath and will now where the non-rebreather.  Sats rapidly improved to 97 percent.  Decreased breath sounds and wheezes bilaterally   Abdominal: Abdomen is soft. Bowel sounds are normal. She exhibits no distension and no mass. There is no abdominal tenderness. There is no rebound and no guarding.   Genitourinary:    Genitourinary Comments: Thin light brown stool no visible blood     Musculoskeletal:         General: No tenderness or edema. Normal range of motion.      Cervical back: Normal range of motion and neck supple.     Neurological: She is alert and oriented to person, place, and time. She has normal strength. No cranial nerve deficit or sensory deficit.   Skin: Skin is warm and dry. No rash noted. No erythema. No pallor.   Psychiatric: She has a normal mood and affect. Her behavior is normal. Judgment and thought content normal.         ED Course   Procedures  Labs Reviewed   CBC W/ AUTO DIFFERENTIAL - Abnormal; Notable for the following components:       Result Value    RBC 2.70 (*)     Hemoglobin 7.6 (*)     Hematocrit 24.3 (*)     MCHC 31.3 (*)     Immature Granulocytes 0.9 (*)     Gran # (ANC) 10.6 (*)     Immature Grans (Abs) 0.11 (*)     Lymph # 0.8 (*)     Gran % 87.0 (*)     Lymph % 6.3 (*)     All other components within normal limits   COMPREHENSIVE METABOLIC PANEL - Abnormal; Notable for the following components:    Sodium 133 (*)     CO2 18 (*)     BUN 43 (*)     Creatinine 2.2 (*)     Albumin 3.1 (*)     eGFR if  23.2 (*)     eGFR if non  20.1 (*)     All other components within normal limits   C-REACTIVE PROTEIN - Abnormal; Notable for the following components:    CRP 6.72 (*)     All other components within normal limits   FERRITIN - Abnormal; Notable for the following components:    Ferritin 350 (*)     All other  components within normal limits   B-TYPE NATRIURETIC PEPTIDE - Abnormal; Notable for the following components:     (*)     All other components within normal limits   CULTURE, BLOOD   CULTURE, BLOOD   LACTATE DEHYDROGENASE   CK   LACTIC ACID, PLASMA   TROPONIN I   PROCALCITONIN   D DIMER, QUANTITATIVE   OCCULT BLOOD X 1, STOOL   TYPE & SCREEN     EKG Readings: (Independently Interpreted)   Initial Reading: No STEMI. Rhythm: Normal Sinus Rhythm. Heart Rate: Sixty-six. Ectopy: No Ectopy.   Incomplete left bundle branch block     ECG Results          EKG 12-lead (Final result)  Result time 01/07/22 18:30:22    Final result by Interface, Lab In [facility] (01/07/22 18:30:22)                 Narrative:    Test Reason : U07.1,    Vent. Rate : 066 BPM     Atrial Rate : 066 BPM     P-R Int : 168 ms          QRS Dur : 110 ms      QT Int : 432 ms       P-R-T Axes : 061 007 066 degrees     QTc Int : 452 ms    Normal sinus rhythm  Incomplete left bundle branch block  Borderline Abnormal ECG  No previous ECGs available  Confirmed by Abelino Soriano MD ([id number]) on 1/7/2022 6:30:17 PM    Referred By: [email or web address]   SELF           Confirmed By:Abelino Soriano MD                            Imaging Results          X-Ray Chest AP Portable (Final result)  Result time 01/07/22 15:07:22    Final result by Elier Beasley MD (01/07/22 15:07:22)                 Narrative:    Reason: COVID-19    FINDINGS:    PA and lateral chest with comparison chest x-ray June 12, 2017 show normal cardiomediastinal silhouette.  Hazy ill-defined lung opacities are noted throughout the left lung and at the right lung base. Pulmonary vasculature is normal. No acute osseous abnormality.    IMPRESSION:    Hazy ill-defined opacities noted bilaterally, left greater than right. Findings are consistent with infectious infiltrate.    Electronically signed by:  Elier Beasley DO  1/7/2022 3:07 PM CST Workstation: [id number]                            X-Rays:    Independently Interpreted Readings:   Chest X-Ray: Bilateral interstitial infiltrates left worse than right concerning for COVID pneumonia     Medications   dexamethasone injection 6 mg (6 mg Intravenous Given 1/7/22 1508)   albuterol inhaler 2 puff (2 puffs Inhalation Given 1/7/22 1626)     Medical Decision Making:   Independently Interpreted Test(s):   I have ordered and independently interpreted X-rays - see prior notes.  I have ordered and independently interpreted EKG Reading(s) - see prior notes  Clinical Tests:   Lab Tests: Ordered and Reviewed  The following lab test(s) were unremarkable: D-Dimer, Lactate and Troponin       <> Summary of Lab: Procalcitonin less than 0.05      Radiological Study: Ordered and Reviewed  Medical Tests: Ordered and Reviewed  ED Management:  81-year-old female with history of hypertension, type 1 diabetes hypothyroidism anxiety and arthritis who presents to the ER for increasing shortness of breath.  Patient was diagnosed with COVID on December 25th she had 2 inpatient stays at [facility].  She was discharged home several days ago.  Today her sats were in 68% on RA and she was having a wet cough with yellow sputum, chest tightness with cough.  She was placed on a non-rebreather at 515 liters by EMS sats improved to 97 percent She reports nausea no vomiting diarrhea all night.  Patient's 2 daughters at bedside reports they did not see any blood in stool or black stool but did notice a small amount of red blood in her diaper.  They report she is on 81 milligram was of aspirin but no NSAIDs or anticoagulants.   She has not been weak or dizzy.  She denies headache.  On physical exam she is on 15 liters non-rebreather and reports she does not feel she needs the oxygen the longer she took it off and sats decreased to 86 percent she was placed back on the non-rebreather sats improved to 97 percent.  Patient was not able to tolerate the non-rebreather well so she was placed on 10  liters high-flow nasal cannula with humidified oxygen and she is able to tolerate this sats are staying at 95. Chest x-ray revealed bilateral pulmonary infiltrates left worse than right concerning for pulmonary pneumonia.  Patient has underlying history of COVID.  She was also significantly anemic at 7.6 and 24.3 with a normal MCV of 90. Patient had blood work done on January 4th at [facility] and H&H was 11.2 and 33.4.  Hemoccult was performed and is pending at this time stool was light brown in color.  BUN was 43 creatinine 2.2 on January 4th 34 and 1.47.  Patient also had elevated inflammatory markers including CRP of 6.72 ferritin 350  CPK 66 , D-dimer negative procalcitonin negative blood cultures were drawn.  Patient is agreeable for admission for COVID pneumonia anemia acute kidney injury.  She was treated with albuterol and Decadron 6 milligrams IV.  Meri Hurst M.D.  7:14 PM 1/7/2022               Attending Attestation:         Attending Critical Care:   Critical Care Times:   Direct Patient Care (initial evaluation, reassessments, and time considering the case)................................................................10 minutes.   Additional History from reviewing old medical records or taking additional history from the family, EMS, PCP, etc.......................5 minutes.   Ordering, Reviewing, and Interpreting Diagnostic Studies...............................................................................................................5 minutes.   Documentation..................................................................................................................................................................................10 minutes.   Consultation with other Physicians. .................................................................................................................................................5 minutes.   Consultation with the  patient's family directly relating to the patient's condition, care, and DNR status (when patient unable)......5 minutes.   ==============================================================  · Total Critical Care Time - exclusive of procedural time: 40 minutes.  ==============================================================  Critical care was necessary to treat or prevent imminent or life-threatening deterioration of the following conditions: respiratory failure, dehydration and renal failure.   Critical care was time spent personally by me on the following activities: obtaining history from patient or relative, examination of patient, review of old charts, ordering lab, x-rays, and/or EKG, development of treatment plan with patient or relative, evaluation of patient's response to treatment, ordering and performing treatments and interventions, discussion with consultants, interpretation of cardiac measurements and re-evaluation of patient's conition.   Critical Care Condition: life-threatening                      Clinical Impression:   Final diagnoses:  [U07.1] COVID-19  [R09.02] Hypoxia  [D64.9] Anemia, unspecified type  [N17.9] MARIE (acute kidney injury)  [U07.1, J12.82] Pneumonia due to COVID-19 virus (Primary)          ED Disposition Condition    Admit               Meri Hurst MD  01/07/22 1916

## 2022-01-08 NOTE — PROGRESS NOTES
"[facility] Medicine Progress Note  Patient Name: Peg Cabezas MRN: [id number]   Patient Class: IP- Inpatient  Length of Stay: 1   Admission Date: 1/7/2022  1:55 PM Attending Physician: Tianna Hamilton MD   Primary Care Provider: Marcelino Leal MD Face-to-Face encounter date: 01/08/2022   Chief Complaint: Shortness of Breath (Covid Positive 12/25/2021)    Assessment & Plan:   Peg Cabezas is a 81 y.o. female admitted for    Active Problems/Assessment & Plan  Acute Hypoxemic Respiratory Failure   COVID 19 Bilateral pneumonia  Chronic symptomatic anemia - s/p transfusion   Acute on chronic CKD  Diabetes mellitus type II  Hypothyroidism  Dementia    Plan  Oxygen supplementation  Wean as tolerated  On dexamethasone, trend inflammatory markers  remdesivir given in [facility] from 12/25 to 12/27  Pulmonary evaluation pending  Encourage ambulation, incentive spirometry    Discharge Planning:   PT C/S for debility/fraility/deconditioning and assistance in discharge needs.     Subjective:    Interval History   Patient very short of breath  Productive cough with sputum  Denies chest pain, palpitations, abdominal pain, nausea/vomiting.   No concerns/issues overnight reported by the patient or the nursing staff.  Reviewed the labs and discussed the plan of care.   No family present at bedside.     Review of Systems   All other Review of Systems were found to be negative expect for that mentioned already in HPI.   Objective:   Physical Exam  /65 (BP Location: Right arm, Patient Position: Lying)   Pulse 67   Temp 98.1 °F (36.7 °C) (Oral)   Resp 18   Ht 5' 2" (1.575 m)   Wt 72.1 kg (158 lb 15.2 oz)   SpO2 (!) 92%   BMI 29.07 kg/m²   Vitals reviewed.    Constitutional: No distress.   HENT: Atraumatic.   Cardiovascular: Normal rate, regular rhythm and normal heart sounds.   Pulmonary/Chest: Effort normal. Clear to auscultation bilaterally. No wheezes.   Abdominal: Soft. Bowel " sounds are normal. Exhibits no distension and no mass. No tenderness  Neurological: Alert.   Skin: Skin is warm and dry.     Following labs were Reviewed   Recent Labs   Lab 01/08/22  0716   WBC 8.24   HGB 11.8*   HCT 36.8*      CALCIUM 8.9   ALBUMIN 2.9*   PROT 6.7      K 3.9   CO2 21*      BUN 45*   CREATININE 2.0*   ALKPHOS 50*   ALT 22   AST 25   BILITOT 0.7     No results found for: POCTGLUCOSE     All labs within the past 24 hours have been reviewed  Microbiology Results (last 7 days)     Procedure Component Value Units Date/Time    Blood culture x two cultures. Draw prior to antibiotics. [[id number]] Collected: 01/07/22 1450    Order Status: Completed Specimen: Blood from Peripheral, Wrist, Left Updated: 01/08/22 1632     Blood Culture, Routine No Growth to date      No Growth to date    Narrative:      Aerobic and anaerobic    Blood culture x two cultures. Draw prior to antibiotics. [[id number]] Collected: 01/07/22 1507    Order Status: Completed Specimen: Blood from Peripheral, Antecubital, Left Updated: 01/08/22 1632     Blood Culture, Routine No Growth to date      No Growth to date    Narrative:      Aerobic and anaerobic        X-Ray Chest AP Portable   Final Result          Inpatient medications  Scheduled Meds:   amLODIPine  10 mg Oral Daily    atorvastatin  20 mg Oral Daily    cholecalciferol (vitamin D3)  1 tablet Oral Daily    dexamethasone  6 mg Intravenous Daily    docusate sodium  100 mg Oral BID    donepeziL  10 mg Oral QHS    EScitalopram oxalate  20 mg Oral Daily    ezetimibe  10 mg Oral Daily    folic acid  1 mg Oral Daily    gabapentin  100 mg Oral BID    levothyroxine  88 mcg Oral Before breakfast    memantine  10 mg Oral Daily    metoprolol tartrate  25 mg Oral BID     Continuous Infusions:  PRN Meds:.sodium chloride, acetaminophen, albuterol **AND** MDI Q4H PRN, benzonatate, clonazePAM, dextrose 50%, dextrose 50%, glucagon (human recombinant), glucose,  glucose, insulin aspart U-100, melatonin, naloxone, ondansetron, sodium chloride 0.9%, traMADoL    Above encounter included review of the medical records, interviewing and examining the patient face-to-face, discussion with family and other health care providers, ordering and interpreting lab/test results and formulating a plan of care.     Medical Decision Making:    [] Low Complexity  [x] Moderate Complexity  [] High Complexity    Tianna Hamilton MD  [facility] Hospitalist  01/08/2022

## 2022-01-08 NOTE — H&P
[facility] Medicine History & Physical Examination   Patient Name: Peg Cabezas  MRN: [id number]  Patient Class: IP- Inpatient   Admission Date: 1/7/2022  1:55 PM  Length of Stay: 0  Attending Physician: Justen Hernandez MD  Primary Care Provider: Marcelino Leal MD  Face-to-Face encounter date: 01/07/2022  Code Status: full code  Chief Complaint: Shortness of Breath (Covid Positive 12/25/2021)          Patient information was obtained from patient, past medical records and ER records.   HISTORY OF PRESENT ILLNESS:   History was obtained from the patient and collateral obtained from the family present at the bedside and ER physician Sign-out. Patient is an 81-year-old female with history of hypertension, type 1 diabetes, hypothyroidism, CKD who presents to the ER for shortness of breath.  Patient was diagnosed with COVID on December 25th and admitted to [facility] and treated with IV steroids and remdesivir. She improved and was discharged home 12/27/2021 but was readmitted on 01/02/2022 for weakness, hyponatremia, dehydration. She was discharged a few days ago with home health. Per her daughters since the last discharge the patient has had progressive SOB.    Today her sats were 68% on RA and she reports productive cough with yellow colored sputum.  She was placed on a NRB by EMS with improvement to 97%.  Additionally she reports chest discomfort with coughing. She was brought to the ED for further evaluation.       Patient denies change in vision, hearing, headache, fever, palpitations, abdominal pain, diarrhea, constipation, vomiting, dysuria, hematuria, numbness, tingling or LOC. Daughter at the bedside reports she saw a small amount of bright red blood in her diaper on yesterday but she has had light brown soft stools since then.     In the ED patient is afebrile. She is requiring 9L high flow NC to keep O2 sat mid 90s.   CBC with WBC 12, H/H 7.6/24 which is decrease from 11/33  three days ago  CMP with sodium 133, CO2 18, BUN 43, creatinine 2.2  CRP 6.72, ferritin elevated 350.  Procalcitonin WNL  BNP is elevated 247 and Troponin is WNL  EKG shows normal sinus rhythm with incomplete LBBB  CXR shows hazy ill defined opacities bilaterally left greater than right consistent with infectious infiltrate.  The patient is treated with Decadron and albuterol MDI in the ED      REVIEW OF SYSTEMS:   10 Point Review of System was performed and was found to be negative except for that mentioned already in the HPI above.     PAST MEDICAL HISTORY:     Past Medical History:   Diagnosis Date    Anxiety     Arthritis     Diabetes mellitus type I     Hypertension     Thyroid disease        PAST SURGICAL HISTORY:     Past Surgical History:   Procedure Laterality Date    CHOLECYSTECTOMY      CYST REMOVAL      ELBOW SURGERY      HIP SURGERY      left    HYSTERECTOMY      SHOULDER SURGERY         ALLERGIES:   Codeine, Erythromycin, Sulfa (sulfonamide antibiotics), Atorvastatin calcium, and Fenofibrate    FAMILY HISTORY:     History reviewed. No pertinent family history.    SOCIAL HISTORY:     Social History     Tobacco Use    Smoking status: Former Smoker    Smokeless tobacco: Never Used   Substance Use Topics    Alcohol use: No        Social History     Substance and Sexual Activity   Sexual Activity Not on file        HOME MEDICATIONS:     Prior to Admission medications    Medication Sig Start Date End Date Taking? Authorizing Provider   amLODIPine (NORVASC) 10 MG tablet Take 10 mg by mouth once daily. 8/6/21 2/2/22 Yes Historical Provider   aspirin (ASPIR-81 ORAL) Take 81 mg by mouth once daily.   Yes Historical Provider   CHOLECALCIFEROL, VITAMIN D3, ORAL Take 1 tablet by mouth once daily.   Yes Historical Provider   clonazePAM (KLONOPIN) 0.5 MG tablet Take 0.5 mg by mouth 2 (two) times daily as needed for Anxiety.   Yes Historical Provider   dexAMETHasone (DECADRON) 4 MG Tab Take 4 mg by mouth  once daily. For 10 days 1/4/22 1/14/22 Yes Historical Provider   donepeziL (ARICEPT) 10 MG tablet Take 10 mg by mouth every evening. 3/26/21  Yes Historical Provider   escitalopram oxalate (LEXAPRO) 20 MG tablet Take 1 tablet by mouth once daily. 9/29/17  Yes Historical Provider   ezetimibe (ZETIA) 10 mg tablet Take 10 mg by mouth once daily. 11/1/21  Yes Historical Provider   gabapentin (NEURONTIN) 100 MG capsule Take 1 capsule by mouth 3 (three) times daily. 9/29/17  Yes Historical Provider   gemfibroziL (LOPID) 600 MG tablet Take 600 mg by mouth 2 (two) times daily before meals.   Yes Historical Provider   levoFLOXacin (LEVAQUIN) 750 MG tablet Take 750 mg by mouth once daily. for 10 days 1/4/22 1/14/22 Yes Historical Provider   levothyroxine (SYNTHROID) 88 MCG tablet Take 1 tablet by mouth once daily. 9/29/17  Yes Historical Provider   memantine (NAMENDA) 10 MG Tab Take 10 mg by mouth 2 (two) times a day. 3/26/21  Yes Historical Provider   metFORMIN (GLUCOPHAGE) 500 MG tablet Take 500 mg by mouth daily with breakfast. 11/10/21  Yes Historical Provider   metoprolol tartrate (LOPRESSOR) 25 MG tablet Take 25 mg by mouth 2 (two) times a day. 11/8/21  Yes Historical Provider   rosuvastatin (CRESTOR) 5 MG tablet Take 5 mg by mouth once daily. 10/13/21  Yes Historical Provider   torsemide (DEMADEX) 10 MG Tab Take 1 tablet by mouth once daily.   Yes Historical Provider   tramadol (ULTRAM) 50 mg tablet Take 50 mg by mouth 2 (two) times daily as needed. 6/5/17  Yes Historical Provider   docusate sodium (COLACE) 100 MG capsule Take 100 mg by mouth 2 (two) times daily.    Historical Provider   polyethylene glycol (GLYCOLAX) 17 gram/dose powder Take 17 g by mouth once daily.    Historical Provider   enoxaparin (LOVENOX) 150 mg/mL Syrg Inject into the skin.  1/7/22  Historical Provider   hydrALAZINE (APRESOLINE) 25 MG tablet  5/12/17 1/7/22  Historical Provider   insulin aspart (NOVOLOG) 100 unit/mL injection Inject into the  "skin 3 (three) times daily before meals.  1/7/22  Historical Provider   isosorbide mononitrate (IMDUR) 30 MG 24 hr tablet Take 1 tablet by mouth once daily. 9/29/17 1/7/22  Historical Provider   meclizine (ANTIVERT) 25 mg tablet Take 1 tablet by mouth 3 (three) times daily. 9/29/17 1/7/22  Historical Provider   oxycodone (ROXICODONE) 5 mg/5 mL Soln Take by mouth.  1/7/22  Historical Provider   pravastatin (PRAVACHOL) 40 MG tablet Take 1 tablet by mouth once daily. 9/29/17 1/7/22  Historical Provider         PHYSICAL EXAM:   /60   Pulse 67   Temp 98.8 °F (37.1 °C) (Oral)   Resp 20   Ht 5' 2" (1.575 m)   Wt 68 kg (150 lb)   SpO2 96%   BMI 27.44 kg/m²   Vitals Reviewed  General appearance: Well-developed, well-nourished, elderly White female  Skin: No Rash.  Warm, dry  Neuro:  Awake alert oriented to self place and situation. Motor and sensory exams grossly intact. Good tone.  Generalized weakness  HENT: Atraumatic head.  Dry mucous membranes of oral cavity.  Eyes: Normal extraocular movements. PERRL   Neck: Supple. No evidence of lymphadenopathy. No thyroidomegaly.  Lungs: Tachypnea. Clear to auscultation bilaterally. No wheezing present.   Heart: Regular rate and rhythm. S1 and S2 present with no murmurs/gallop/rub. No pedal edema. No JVD present.   Abdomen: Soft, non-distended, non-tender. No rebound tenderness/guarding. No masses or organomegaly. Bowel sounds are normal. Bladder is not palpable.   Extremities: capillary refill less than 2 seconds.   Psych/mental status: Alert and oriented. Cooperative. Responds appropriately to questions.   EMERGENCY DEPARTMENT LABS AND IMAGING:     Labs Reviewed   CBC W/ AUTO DIFFERENTIAL - Abnormal; Notable for the following components:       Result Value    RBC 2.70 (*)     Hemoglobin 7.6 (*)     Hematocrit 24.3 (*)     MCHC 31.3 (*)     Immature Granulocytes 0.9 (*)     Gran # (ANC) 10.6 (*)     Immature Grans (Abs) 0.11 (*)     Lymph # 0.8 (*)     Gran % 87.0 (*)  "    Lymph % 6.3 (*)     All other components within normal limits   COMPREHENSIVE METABOLIC PANEL - Abnormal; Notable for the following components:    Sodium 133 (*)     CO2 18 (*)     BUN 43 (*)     Creatinine 2.2 (*)     Albumin 3.1 (*)     eGFR if  23.2 (*)     eGFR if non  20.1 (*)     All other components within normal limits   C-REACTIVE PROTEIN - Abnormal; Notable for the following components:    CRP 6.72 (*)     All other components within normal limits   FERRITIN - Abnormal; Notable for the following components:    Ferritin 350 (*)     All other components within normal limits   B-TYPE NATRIURETIC PEPTIDE - Abnormal; Notable for the following components:     (*)     All other components within normal limits   CULTURE, BLOOD   CULTURE, BLOOD   LACTATE DEHYDROGENASE   CK   LACTIC ACID, PLASMA   TROPONIN I   PROCALCITONIN   D DIMER, QUANTITATIVE   OCCULT BLOOD X 1, STOOL   SARS-COV-2 RNA AMPLIFICATION, QUAL   TROPONIN I   POCT GLUCOSE, HAND-HELD DEVICE   TYPE & SCREEN       X-Ray Chest AP Portable   Final Result          ASSESSMENT & PLAN:   Acute Hypoxemic Respiratory Failure   Admit to Cardio A with isolation.   Supplemental oxygen to maintain goal of > 94 % saturation.  Patient currently requiring high-flow nasal cannula 10 L  Pulmonology consult    COVID 19 Bilateral pneumonia - Isolation and Airborne precautions.   Monitor with inflammatory markers including CRP, D-Dimer and Ferritin.  Dexamethasone 6 mg IV daily.  Up in chair and prone in bed if worsening oxygenation,   Anti-tussives p.r.n.  Albuterol MDI p.r.n.  Incentive spirometry  Patient treated with remdesivir during hospital admission at [facility] 12/25 through 12/27/2021    Acute on Chronic symptomatic anemia  Transfuse 1 unit PRBC;  Occult stool negative; recheck in a.m.  Hold aspirin and avoid other anticoagulants  Check iron studies    Acute on chronic CKD  Hold home diuretic; monitor for fluid  overload as patient is getting blood transfusion; appears clinically dry at this time  Encourage p.o. fluids  Will hold off on IV fluids at this time as patient is COVID positive and hypoxic requiring high-flow nasal cannula  Follow BUN/creatinine    Diabetes Mellitus   Insulin Sliding scale; hypoglycemia protocol  Diabetic diet    Thyroid disease  Continue home meds    HTN  Continue home meds    Dementia  Continue home meds  Fall precautions    Discharge Planning and Disposition:  Patient will be discharged in 2-3 days  ________________________________________________________________________________    This patient is high risk for life-threatening deterioration and death secondary to above comorbidities and need for IV treatment. This patient meets inpatient criteria.   Face-to-Face encounter date: 01/07/2022  Encounter included review of the medical records, interviewing and examining the patient face-to-face, discussion with family and other health care providers including emergency medicine physician, admission orders, interpreting lab/test results and formulating a plan of care.   Medical Decision Making during this encounter was  [_] Low Complexity  [_] Moderate Complexity  [x] High Complexity  _________________________________________________________________________________    INPATIENT LIST OF MEDICATIONS     Current Facility-Administered Medications:     acetaminophen tablet 650 mg, 650 mg, Oral, Q4H PRN, Kaylah Mccollum NP    albuterol inhaler 2 puff, 2 puff, Inhalation, Q4H PRN **AND** MDI Q4H PRN, , , Q4H PRN, Kaylah Mccollum NP    [START ON 1/8/2022] amLODIPine tablet 10 mg, 10 mg, Oral, Daily, Kaylah Mccollum NP    [START ON 1/8/2022] atorvastatin tablet 20 mg, 20 mg, Oral, Daily, Kaylah Mccollum NP    [START ON 1/8/2022] cholecalciferol (vitamin D3) tablet 2,000 Units, 1 tablet, Oral, Daily, Kaylah Mccollum NP    clonazePAM tablet 0.5 mg, 0.5 mg, Oral, BID PRN, Kaylah Mccollum,  NP    [START ON 1/8/2022] dexamethasone injection 6 mg, 6 mg, Intravenous, Daily, Kaylah Mccollum NP    dextrose 50% injection 12.5 g, 12.5 g, Intravenous, PRN, Kaylah Mccollum NP    dextrose 50% injection 25 g, 25 g, Intravenous, PRN, Kaylah Mccollum NP    docusate sodium capsule 100 mg, 100 mg, Oral, BID, Kaylah Mccollum NP    donepeziL tablet 10 mg, 10 mg, Oral, QHS, Kaylah Mccollum NP    [START ON 1/8/2022] EScitalopram oxalate tablet 20 mg, 20 mg, Oral, Daily, Kaylah Mccollum NP    [START ON 1/8/2022] ezetimibe tablet 10 mg, 10 mg, Oral, Daily, Kaylah Mccollum NP    gabapentin capsule 100 mg, 100 mg, Oral, BID, Kaylah Mccollum NP    glucagon (human recombinant) injection 1 mg, 1 mg, Intramuscular, PRN, Kaylah Mccollum NP    glucose chewable tablet 16 g, 16 g, Oral, PRN, Kaylah Mccollum NP    glucose chewable tablet 24 g, 24 g, Oral, PRN, Kaylah Mccollum NP    insulin aspart U-100 pen 0-5 Units, 0-5 Units, Subcutaneous, QID (AC + HS) PRN, Kaylah Mccollum NP    [START ON 1/8/2022] levothyroxine tablet 88 mcg, 88 mcg, Oral, Daily, Kaylah Mccollum NP    melatonin tablet 6 mg, 6 mg, Oral, Nightly PRN, Kaylah Mccollum NP    [START ON 1/8/2022] memantine tablet 10 mg, 10 mg, Oral, Daily, Kaylah Mccollum NP    metoprolol tartrate (LOPRESSOR) tablet 25 mg, 25 mg, Oral, BID, Kaylah Mccollum NP    naloxone 0.4 mg/mL injection 0.02 mg, 0.02 mg, Intravenous, PRN, Kaylah Mccollum NP    ondansetron injection 4 mg, 4 mg, Intravenous, Q6H PRN, Kaylah Mccollum NP    sodium chloride 0.9% flush 10 mL, 10 mL, Intravenous, PRN, Kaylah Mccollum NP    traMADoL tablet 50 mg, 50 mg, Oral, BID PRN, Kaylah Catholic, NP    Current Outpatient Medications:     amLODIPine (NORVASC) 10 MG tablet, Take 10 mg by mouth once daily., Disp: , Rfl:     aspirin (ASPIR-81 ORAL), Take 81 mg by mouth once daily., Disp: , Rfl:     CHOLECALCIFEROL, VITAMIN D3, ORAL, Take 1  tablet by mouth once daily., Disp: , Rfl:     clonazePAM (KLONOPIN) 0.5 MG tablet, Take 0.5 mg by mouth 2 (two) times daily as needed for Anxiety., Disp: , Rfl:     dexAMETHasone (DECADRON) 4 MG Tab, Take 4 mg by mouth once daily. For 10 days, Disp: , Rfl:     donepeziL (ARICEPT) 10 MG tablet, Take 10 mg by mouth every evening., Disp: , Rfl:     escitalopram oxalate (LEXAPRO) 20 MG tablet, Take 1 tablet by mouth once daily., Disp: , Rfl:     ezetimibe (ZETIA) 10 mg tablet, Take 10 mg by mouth once daily., Disp: , Rfl:     gabapentin (NEURONTIN) 100 MG capsule, Take 1 capsule by mouth 3 (three) times daily., Disp: , Rfl:     gemfibroziL (LOPID) 600 MG tablet, Take 600 mg by mouth 2 (two) times daily before meals., Disp: , Rfl:     levoFLOXacin (LEVAQUIN) 750 MG tablet, Take 750 mg by mouth once daily. for 10 days, Disp: , Rfl:     levothyroxine (SYNTHROID) 88 MCG tablet, Take 1 tablet by mouth once daily., Disp: , Rfl:     memantine (NAMENDA) 10 MG Tab, Take 10 mg by mouth 2 (two) times a day., Disp: , Rfl:     metFORMIN (GLUCOPHAGE) 500 MG tablet, Take 500 mg by mouth daily with breakfast., Disp: , Rfl:     metoprolol tartrate (LOPRESSOR) 25 MG tablet, Take 25 mg by mouth 2 (two) times a day., Disp: , Rfl:     rosuvastatin (CRESTOR) 5 MG tablet, Take 5 mg by mouth once daily., Disp: , Rfl:     torsemide (DEMADEX) 10 MG Tab, Take 1 tablet by mouth once daily., Disp: , Rfl:     tramadol (ULTRAM) 50 mg tablet, Take 50 mg by mouth 2 (two) times daily as needed., Disp: , Rfl:     docusate sodium (COLACE) 100 MG capsule, Take 100 mg by mouth 2 (two) times daily., Disp: , Rfl:     polyethylene glycol (GLYCOLAX) 17 gram/dose powder, Take 17 g by mouth once daily., Disp: , Rfl:       Scheduled Meds:   [START ON 1/8/2022] amLODIPine  10 mg Oral Daily    [START ON 1/8/2022] atorvastatin  20 mg Oral Daily    [START ON 1/8/2022] cholecalciferol (vitamin D3)  1 tablet Oral Daily    [START ON 1/8/2022]  dexamethasone  6 mg Intravenous Daily    docusate sodium  100 mg Oral BID    donepeziL  10 mg Oral QHS    [START ON 1/8/2022] EScitalopram oxalate  20 mg Oral Daily    [START ON 1/8/2022] ezetimibe  10 mg Oral Daily    gabapentin  100 mg Oral BID    [START ON 1/8/2022] levothyroxine  88 mcg Oral Daily    [START ON 1/8/2022] memantine  10 mg Oral Daily    metoprolol tartrate  25 mg Oral BID     Continuous Infusions:  PRN Meds:.      Kaylah Mccollum  [facility]ist  01/07/2022

## 2022-01-08 NOTE — CARE UPDATE
01/08/22 0520   Patient Assessment/Suction   Level of Consciousness (AVPU) alert   Respiratory Effort Unlabored   Rhythm/Pattern, Respiratory pattern regular;unlabored   PRE-TX-O2   O2 Device (Oxygen Therapy) High Flow nasal Cannula   Flow (L/min) 8   SpO2 (!) 93 %   Pulse Oximetry Type Continuous   Pulse 62   Resp 20   Education   $ Education Other (see comment);15 min  (abg)   Labs   $ Was an ABG obtained? Arterial Puncture;ISTAT - Blood gas   $ Labs Tech Time 15 min   Critical Value Communication   Date Result Received 01/08/22   Time Result Received 0524   Resulting Department of Critical Value resp   Who communicated critical value from resulting department? dblack   Critical Test #1 po2   Critical Test #1 Result 57   Doctor not notified of critical value due to: known abnormal - expected abnormal   Respiratory Evaluation   $ Care Plan Tech Time 30 min   $ Eval/Re-eval Charges Evaluation   Evaluation For New Orders

## 2022-01-09 NOTE — PT/OT/SLP EVAL
Physical Therapy Evaluation    Patient Name:  Peg Cabezas   MRN:  [id number]    Recommendations:     Discharge Recommendations:  nursing facility, skilled   Discharge Equipment Recommendations: other (see comments) (TBD)   Barriers to discharge: desat with limited mobility and increase assist with mobility    Assessment:     Peg Cabezas is a 81 y.o. female admitted with a medical diagnosis of Acute on chronic respiratory failure with hypoxia.  She presents with the following impairments/functional limitations:  weakness,impaired endurance,impaired self care skills,impaired functional mobilty,gait instability,impaired balance,decreased lower extremity function,decreased safety awareness,impaired cardiopulmonary response to activity.    Pt found in bed with HOB elevated patient on 30L 80% at rest with SAO2 92%. Increase to 40L 100% in preparation for mobility. Pt tolerates all mobility with mod A and VI to stay on task.     Rehab Prognosis: Fair; patient would benefit from acute skilled PT services to address these deficits and reach maximum level of function.    Recent Surgery: * No surgery found *      Plan:     During this hospitalization, patient to be seen 6 x/week to address the identified rehab impairments via gait training,therapeutic activities,therapeutic exercises,neuromuscular re-education and progress toward the following goals:    · Plan of Care Expires:  02/09/22    Subjective     Chief Complaint: cold  Patient/Family Comments/goals: none stated  Pain/Comfort:  · Pain Rating 1: 0/10    Patients cultural, spiritual, Temple conflicts given the current situation: no    Living Environment:  Pt reports that she lives alone in a trailer with a few steps to enter. Unclear if there are handrails.   Prior to admission, patients level of function was RW MI.  Equipment used at home: walker, rolling.  DME owned (not currently used): none.  Upon discharge, patient will have assistance from TBD.    Objective:      Communicated with RN prior to session.  Patient found HOB elevated with peripheral IV,telemetry,oxygen (vapotherm)  upon PT entry to room.    General Precautions: Standard, fall,airborne,contact,droplet   Orthopedic Precautions:N/A   Braces: N/A  Respiratory Status: High flow, flow 30 L/min, concentration 80%    Exams:  · Cognitive Exam:  Patient is oriented to Person, Place and year not month  · RLE ROM: WFL  · RLE Strength: 4-/5  · LLE ROM: WFL  · LLE Strength: 4-/5    Functional Mobility:  · Bed Mobility:     · Supine to Sit: moderate assistance  · Sit to Supine: moderate assistance  · Transfers:     · Sit to Stand:  moderate assistance with rolling walker  · Gait: lateral side stepping mod A RW    Therapeutic Activities and Exercises:   Pt educated on POC, importance of time OOB, pursed lip breathing, discharge recommendation, need for assist with mobility, reoriented to situation, use of call bell to seek assistance as needed and fall prevention      AM-PAC 6 CLICK MOBILITY  Total Score:15     Patient left HOB elevated with all lines intact, call button in reach and bed alarm on.    GOALS:   Multidisciplinary Problems     Physical Therapy Goals        Problem: Physical Therapy Goal    Goal Priority Disciplines Outcome Goal Variances Interventions   Physical Therapy Goal     PT, PT/OT Ongoing, Progressing     Description: Goals to be met by: Discharge     Patient will increase functional independence with mobility by performin. Supine to sit with Supervision  2. Sit to stand transfer with Supervision  3. Bed to chair transfer with Supervision using Rolling Walker  4. Gait  x 100 feet with Supervision using Rolling Walker.                          History:     Past Medical History:   Diagnosis Date    Anxiety     Arthritis     Diabetes mellitus type I     Hypertension     Thyroid disease        Past Surgical History:   Procedure Laterality Date    CHOLECYSTECTOMY      CYST REMOVAL      ELBOW  SURGERY      HIP SURGERY      left    HYSTERECTOMY      SHOULDER SURGERY         Time Tracking:     PT Received On: 01/09/22  PT Start Time: 1016     PT Stop Time: 1034  PT Total Time (min): 18 min     Billable Minutes: Evaluation 8 and Therapeutic Activity 10      01/09/2022

## 2022-01-09 NOTE — PROGRESS NOTES
"[facility] Medicine Progress Note  Patient Name: Peg Cabezas MRN: [id number]   Patient Class: IP- Inpatient  Length of Stay: 2   Admission Date: 1/7/2022  1:55 PM Attending Physician: Tianna Hamilton MD   Primary Care Provider: Marcelino Leal MD Face-to-Face encounter date: 01/09/2022   Chief Complaint: Shortness of Breath (Covid Positive 12/25/2021)    Assessment & Plan:   Peg Cabezas is a 81 y.o. female admitted for    Active Problems/Assessment & Plan  Acute Hypoxemic Respiratory Failure   COVID 19 Bilateral pneumonia  Chronic symptomatic anemia - s/p transfusion   Acute on chronic CKD  Diabetes mellitus type II  Hypothyroidism  Dementia    Plan  Oxygen supplementation  Wean as tolerated  On dexamethasone, trend inflammatory markers  Started her back on lovenox  Added incentive spirometry   remdesivir given in [facility] from 12/25 to 12/27  Pulmonary evaluation pending  Encourage ambulation, incentive spirometry    Discharge Planning:   PT C/S for debility/fraility/deconditioning and assistance in discharge needs.     Subjective:    Interval History   Patient very short of breath  Productive cough with sputum  Denies chest pain, palpitations, abdominal pain, nausea/vomiting.   No concerns/issues overnight reported by the patient or the nursing staff.  Reviewed the labs and discussed the plan of care.   No family present at bedside.     Review of Systems   All other Review of Systems were found to be negative expect for that mentioned already in HPI.   Objective:   Physical Exam  BP (!) 149/72   Pulse 85   Temp 98.6 °F (37 °C) (Oral)   Resp (!) 23   Ht 5' 2" (1.575 m)   Wt 72.1 kg (158 lb 15.2 oz)   SpO2 (!) 92%   BMI 29.07 kg/m²   Vitals reviewed.    Constitutional: No distress.   HENT: Atraumatic.   Cardiovascular: Normal rate, regular rhythm and normal heart sounds.   Pulmonary/Chest: Effort normal. Clear to auscultation bilaterally. No wheezes.   Abdominal: " Soft. Bowel sounds are normal. Exhibits no distension and no mass. No tenderness  Neurological: Alert.   Skin: Skin is warm and dry.     Following labs were Reviewed   Recent Labs   Lab 01/09/22  0806   WBC 10.72   HGB 11.8*   HCT 36.4*      CALCIUM 9.1   ALBUMIN 3.1*   PROT 7.1   *   K 3.5   CO2 18*   CL 99   BUN 58*   CREATININE 1.9*   ALKPHOS 51*   ALT 21   AST 24   BILITOT 1.0     No results found for: POCTGLUCOSE     All labs within the past 24 hours have been reviewed  Microbiology Results (last 7 days)     Procedure Component Value Units Date/Time    Blood culture x two cultures. Draw prior to antibiotics. [[id number]] Collected: 01/07/22 1450    Order Status: Completed Specimen: Blood from Peripheral, Wrist, Left Updated: 01/08/22 1632     Blood Culture, Routine No Growth to date      No Growth to date    Narrative:      Aerobic and anaerobic    Blood culture x two cultures. Draw prior to antibiotics. [[id number]] Collected: 01/07/22 1507    Order Status: Completed Specimen: Blood from Peripheral, Antecubital, Left Updated: 01/08/22 1632     Blood Culture, Routine No Growth to date      No Growth to date    Narrative:      Aerobic and anaerobic        X-Ray Chest AP Portable   Final Result      X-Ray Chest AP Portable   Final Result          Inpatient medications  Scheduled Meds:   amLODIPine  10 mg Oral Daily    atorvastatin  20 mg Oral Daily    cholecalciferol (vitamin D3)  1 tablet Oral Daily    dexamethasone  6 mg Intravenous Daily    docusate sodium  100 mg Oral BID    donepeziL  10 mg Oral QHS    enoxparin  30 mg Subcutaneous Q24H    EScitalopram oxalate  20 mg Oral Daily    ezetimibe  10 mg Oral Daily    folic acid  1 mg Oral Daily    gabapentin  100 mg Oral BID    levothyroxine  88 mcg Oral Before breakfast    memantine  10 mg Oral Daily    metoprolol tartrate  25 mg Oral BID     Continuous Infusions:  PRN Meds:.sodium chloride, acetaminophen, albuterol **AND** MDI Q4H PRN,  benzonatate, clonazePAM, dextrose 50%, dextrose 50%, glucagon (human recombinant), glucose, glucose, insulin aspart U-100, melatonin, naloxone, ondansetron, sodium chloride 0.9%, traMADoL    Above encounter included review of the medical records, interviewing and examining the patient face-to-face, discussion with family and other health care providers, ordering and interpreting lab/test results and formulating a plan of care.     Medical Decision Making:    [] Low Complexity  [x] Moderate Complexity  [] High Complexity    Tianna Hamilton MD  [facility] Hospitalist  01/09/2022

## 2022-01-09 NOTE — PROGRESS NOTES
Pharmacist Renal Dose Adjustment Note    Peg Cabezas is a 81 y.o. female being treated with the medication Lovenox.    Patient Data:    Vital Signs (Most Recent):  Temp: 98.9 °F (37.2 °C) (01/09/22 0722)  Pulse: 85 (01/09/22 0845)  Resp: (!) 23 (01/09/22 0845)  BP: (!) 149/72 (01/09/22 0901)  SpO2: (!) 92 % (01/09/22 0845)   Vital Signs (72h Range):  Temp:  [97 °F (36.1 °C)-101 °F (38.3 °C)]   Pulse:  [54-85]   Resp:  [17-27]   BP: (133-164)/()   SpO2:  [79 %-100 %]      Recent Labs   Lab 01/07/22  1450 01/08/22  0716 01/09/22  0806   CREATININE 2.2* 2.0* 1.9*     Serum creatinine: 1.9 mg/dL (H) 01/09/22 0806  Estimated creatinine clearance: 20.9 mL/min (A)    Lovenox 40 mg subq every 24 hours will be changed to Lovenox 30 mg subq every 24 hours due to CrCl < 30ml/min.     Pharmacist's Name: Anika Reddy  Pharmacist's Extension: [phone]

## 2022-01-09 NOTE — PROGRESS NOTES
Progress Note  PULMONARY    Admit Date: 1/7/2022 01/09/2022      SUBJECTIVE:     1/9- pt continues on vapotherm requiring 30L, 80%. Febrile to 101. Vs stable      OBJECTIVE:     Vitals (Most recent):  Vitals:    01/09/22 1109   BP:    Pulse:    Resp:    Temp: 98.6 °F (37 °C)       Vitals (24 hour range):  Temp:  [97 °F (36.1 °C)-101 °F (38.3 °C)]   Pulse:  [58-85]   Resp:  [18-27]   BP: (137-164)/(65-74)   SpO2:  [79 %-100 %]       Intake/Output Summary (Last 24 hours) at 1/9/2022 1315  Last data filed at 1/9/2022 0411  Gross per 24 hour   Intake --   Output 450 ml   Net -450 ml          Physical Exam:  The patient's neuro status (alertness,orientation,cognitive function,motor skills,), pharyngeal exam (oral lesions, hygiene, abn dentition,), Neck (jvd,mass,thyroid,nodes in neck and above/below clavicle),RESPIRATORY(symmetry,effort,fremitus,percussion,auscultation),  Cor(rhythm,heart tones including gallops,perfusion,edema)ABD(distention,hepatic&splenomegaly,tenderness,masses), Skin(rash,cyanosis),Psyc(affect,judgement,).  Exam negative except for these pertinent findings:    Awake, alert, no distress  Bilateral diminished breath sounds and rales  abd soft nontender  No edema    Radiographs reviewed: view by direct vision   CXR 1/9/22- bilateral infiltrates, similar to previous    Labs     Recent Labs   Lab 01/09/22  0806   WBC 10.72   HGB 11.8*   HCT 36.4*        Recent Labs   Lab 01/09/22  0806   *   K 3.5   CL 99   CO2 18*   BUN 58*   CREATININE 1.9*   GLU 84   CALCIUM 9.1   MG 2.1   AST 24   ALT 21   ALKPHOS 51*   BILITOT 1.0   PROT 7.1   ALBUMIN 3.1*   CRP 10.68*   No results for input(s): PH, PCO2, PO2, HCO3 in the last 24 hours.  Microbiology Results (last 7 days)     Procedure Component Value Units Date/Time    Blood culture x two cultures. Draw prior to antibiotics. [[id number]] Collected: 01/07/22 1450    Order Status: Completed Specimen: Blood from Peripheral, Wrist, Left Updated: 01/08/22  1632     Blood Culture, Routine No Growth to date      No Growth to date    Narrative:      Aerobic and anaerobic    Blood culture x two cultures. Draw prior to antibiotics. [[id number]] Collected: 01/07/22 1507    Order Status: Completed Specimen: Blood from Peripheral, Antecubital, Left Updated: 01/08/22 1632     Blood Culture, Routine No Growth to date      No Growth to date    Narrative:      Aerobic and anaerobic          Impression:  Active Hospital Problems    Diagnosis  POA    *Acute on chronic respiratory failure with hypoxia [J96.21]  Unknown      Resolved Hospital Problems   No resolved problems to display.               Plan:     - continue supplemental oxygen to keep sats >92%  - prone as tolerated  - continue inhaled bronchodilators  - incentive spirometry   - continue RDV  - continue decadron 6mg daily  - if not improving consider actemra  - blood sugar, BP control per hospitalist  - monitor inflammatory markers  - prophylactic lovenox     Janiya Stephenson MD  Pulmonary & Critical Care Medicine

## 2022-01-09 NOTE — PT/OT/SLP EVAL
"Occupational Therapy   Evaluation    Name: Peg Cabezas  MRN: [id number]  Admitting Diagnosis:  Acute on chronic respiratory failure with hypoxia  Recent Surgery: * No surgery found *      Recommendations:     Discharge Recommendations: nursing facility, skilled  Discharge Equipment Recommendations:  other (see comments) (TBD at next level of care)  Barriers to discharge:  Decreased caregiver support    Assessment:     Peg Cabezas is a 81 y.o. female with a medical diagnosis of Acute on chronic respiratory failure with hypoxia.  She presents with being alone in an isolation room and needing orientation to her situation. The pt complains of being cold and wants to get under the blankets and back in bed during mobility assessment component of OT evaluation. The pt was noted to have 91% in bed on 80%, 30 flow therefore, the settings were raised to 100%, 40 flow before mobility to allow less desaturation range. The pt O2 sats dropped to 88% after ~ 4 min of EOB sitting and intermittent standing x 1 trial before returning back to bed.  Performance deficits affecting function: weakness,impaired endurance,impaired self care skills,impaired functional mobilty,gait instability,impaired balance,impaired cognition,decreased upper extremity function,decreased lower extremity function,decreased safety awareness,pain,impaired cardiopulmonary response to activity (general with c/o feeling cold and chills).      Rehab Prognosis: Fair; patient would benefit from acute skilled OT services to address these deficits and reach maximum level of function.       Plan:     Patient to be seen 5 x/week to address the above listed problems via therapeutic activities,therapeutic exercises,self-care/home management  · Plan of Care Expires: 02/09/22  · Plan of Care Reviewed with: patient    Subjective     Chief Complaint: "I'm cold; I want to get back under the covers"   Patient/Family Comments/goals: pt does not state; pt with confusion at " this time    Occupational Profile:  Living Environment: to be verified  Previous level of function: Mod I as she lives alone ?  Roles and Routines: lives alone, per pt; verification needed   Equipment Used at Home:  other (see comments) (TBD due to pt having dementia; verification from family needed)  Assistance upon Discharge: unknown     Pain/Comfort:  · Pain Rating 1: 0/10 (pt complains of feeling cold)  · Pain Rating Post-Intervention 1: 0/10    Patients cultural, spiritual, Worship conflicts given the current situation: no    Objective:     Communicated with: Nursing and PT prior to session.  Patient found HOB elevated in reclined position with peripheral IV,oxygen,telemetry,blood pressure cuff,PureWick upon OT entry to room.    General Precautions: Standard, fall,airborne,contact,droplet   Orthopedic Precautions:N/A   Braces: N/A  Respiratory Status: High flow, flow 30 L/min, concentration 80% on vaportherm; O2 saturation of 91%; changed to 40 flow, 100% during OT session ranged 95-88%    Occupational Performance:    Bed Mobility:    · Patient completed Rolling/Turning to Right with not formally assessed due to pt's breathing and maintained the HOB elevated   · Patient completed Scooting/Bridging with contact guard assistance  · Patient completed Supine to Sit with minimum assistance and cues to initiate   · Patient completed Sit to Supine with minimum assistance    Functional Mobility/Transfers:  · Patient completed Sit <> Stand Transfer with minimum assistance and of 2 persons  with  hand-held assist   · Functional Mobility: side stepping to the right to the HOB x 6 steps x 2 person hand held assist with short step length.    Activities of Daily Living:  · Feeding:  moderate assistance with dentures  · Grooming: moderate assistance assist with denture care, however, the pt was able to brush her oral cavity and rinse with Min A, assist with hair combing; SBA with face and hands   · Lower Body Dressing:  "dependence with socks     Cognitive/Visual Perceptual:  Cognitive/Psychosocial Skills:     -       Oriented to: Person, Place, Time and not to situation    -       Follows Commands/attention:Follows one-step commands  -       Communication: clear/fluent  -       Memory: Impaired STM and Poor immediate recall  -       Safety awareness/insight to disability: impaired   -       Mood/Affect/Coping skills/emotional control: Cooperative, Blunted, Pleasant and Guarded and pt states, "Lord have mercy" multiple times during session.  Visual/Perceptual:      -WFL    Physical Exam:  Balance: -       good static sitting balance; static standing: poor +   Postural examination/scapula alignment:    -       Rounded shoulders  Skin integrity: Visible skin intact  Edema:  None noted  Motor Planning: -       wfl  Dominant hand: -       right  Upper Extremity Range of Motion:     -       Right Upper Extremity: WFL  -       Left Upper Extremity: WFL  Upper Extremity Strength:    -       Right Upper Extremity: WFL  -       Left Upper Extremity: WFL   Strength:    -       Right Upper Extremity: WFL  -       Left Upper Extremity: WFL  Fine Motor Coordination:  wfl  Gross motor coordination:   WFL    AMPAC 6 Click ADL:  AMPAC Total Score: 11    Treatment & Education: role of OT,call don't fall; weight shifting, benefits of being out of the bed, orientation to situation needed; breathing technique of "sniffing" through her nose; O2 sats would rise ~ 3-4 points.   Education:    Patient left chair position  with all lines intact, call button, cell phone in reach, bed alarm on and nurse notified of benefits of pt being out of the bed. Pt was set up with fresh cup of ice with her root beer in reach with lid and straw. Pt very appreciative of denture care provided for the pt as she stated, "I really appreciate you doing that".     GOALS:   Multidisciplinary Problems     Occupational Therapy Goals        Problem: Occupational Therapy Goal    " Goal Priority Disciplines Outcome Interventions   Occupational Therapy Goal     OT, PT/OT     Description: Goals to be met by: discharge     Patient will increase functional independence with ADLs by performing:    Feeding with Modified Mercedes.  UE Dressing with Supervision.  LE Dressing with Minimal Assistance.  Grooming while seated at sink with Stand-by Assistance.  Toileting from bedside commode with Contact Guard Assistance for hygiene and clothing management.   Toilet transfer to bedside commode with Contact Guard Assistance.  Increased functional strength to 4+5 for B UE reaching during ADL tasks.                     History:     Past Medical History:   Diagnosis Date    Anxiety     Arthritis     Diabetes mellitus type I     Hypertension     Thyroid disease        Past Surgical History:   Procedure Laterality Date    CHOLECYSTECTOMY      CYST REMOVAL      ELBOW SURGERY      HIP SURGERY      left    HYSTERECTOMY      SHOULDER SURGERY         Time Tracking:     OT Date of Treatment: 01/09/22  OT Start Time: 1401  OT Stop Time: 1424  OT Total Time (min): 23 min    Billable Minutes:Evaluation 5  Self Care/Home Management 18    1/9/2022

## 2022-01-09 NOTE — CONSULTS
01/08/2022      Admit Date: 1/7/2022  Peg Cabezas  New Patient Consult    Chief Complaint   Patient presents with    Shortness of Breath     Covid Positive 12/25/2021       History of Present Illness:  Pt is an 82 yo female with DM1, HTN, hypothyroidism who presented to ED with shortness of breath and cough worsening since 12/25 when she was dx with COVID-19. She has anterior chest pain, back pain and headache. She feels short of breath today while resting in bed. She is requiring vapotherm 30L, 80% to maintain sats. She used to play basketball and enjoys being active. Pt lives by herself but has multiple family members nearby where she lives. She is not vaccinated against COVID-19. She was on 8L oxygen yesterday then received blood transfusion yesterday for Hgb 7.6, now oxygenation has worsened.      PFSH:  Past Medical History:   Diagnosis Date    Anxiety     Arthritis     Diabetes mellitus type I     Hypertension     Thyroid disease      Past Surgical History:   Procedure Laterality Date    CHOLECYSTECTOMY      CYST REMOVAL      ELBOW SURGERY      HIP SURGERY      left    HYSTERECTOMY      SHOULDER SURGERY       Social History     Tobacco Use    Smoking status: Former Smoker    Smokeless tobacco: Never Used   Substance Use Topics    Alcohol use: No     History reviewed. No pertinent family history.  Review of patient's allergies indicates:   Allergen Reactions    Codeine Shortness Of Breath and Anaphylaxis    Erythromycin      Other reaction(s): Unknown  Pt states she doesn't remember the reaction.    Sulfa (sulfonamide antibiotics) Shortness Of Breath    Atorvastatin calcium Rash    Fenofibrate Rash       Performance Status:Performance Status:The patient's activity level is functions out of house.    Review of Systems:  a review of eleven systems covering constitutional, Psych, Eye, HEENT, Respiratory, Cardiac, GI, , Musculoskeletal, Endocrine, Dermatologicwas negative except the above  "mentioned abnormalities and for any pertinent findings as listed below:  Decreased appetite       Exam:Comprehensive exam done. /65 (BP Location: Right arm, Patient Position: Lying)   Pulse 67   Temp 98.1 °F (36.7 °C) (Oral)   Resp 18   Ht 5' 2" (1.575 m)   Wt 72.1 kg (158 lb 15.2 oz)   SpO2 (!) 92%   BMI 29.07 kg/m²   Exam included Vitals as listed, and patient's appearance and affect and alertness and mood, oral exam for yeast and hygiene and pharynx lesions and Mallapatti (M) score, neck with inspection for jvd and masses and thyroid abnormalities and lymph nodes (supraclavicular and infraclavicular nodes also examined and noted if abn), chest exam included symmetry and effort and fremitus and percussion and auscultation, cardiac exam included rhythm and gallops and murmur and rubs and jvd and edema, abdominal exam for mass and hepatosplenomegaly and tenderness and hernias and bowel sounds, Musculoskeletal exam with muscle tone and posture and mobility/gait and  strenght, and skin for rashes and cyanosis and pallor and turgor, extremity for clubbing.  Findings were normal except as listed below:  Awake, alert, restless  Bilateral diminished breath sounds and rales  abd soft nontender  No edema    Radiographs reviewed: view by direct vision   cxr 1/7- bilat mild infiltrates      Labs     Recent Labs   Lab 01/08/22  0716   WBC 8.24   HGB 11.8*   HCT 36.8*        Recent Labs   Lab 01/08/22  0716      K 3.9      CO2 21*   BUN 45*   CREATININE 2.0*      CALCIUM 8.9   MG 2.0   AST 25   ALT 22   ALKPHOS 50*   BILITOT 0.7   PROT 6.7   ALBUMIN 2.9*     Recent Labs   Lab 01/08/22  0520   PH 7.385   PCO2 31.9*   PO2 57*   HCO3 19.1*     Microbiology Results (last 7 days)     Procedure Component Value Units Date/Time    Blood culture x two cultures. Draw prior to antibiotics. [[id number]] Collected: 01/07/22 1450    Order Status: Completed Specimen: Blood from Peripheral, Wrist, Left " Updated: 01/08/22 1632     Blood Culture, Routine No Growth to date      No Growth to date    Narrative:      Aerobic and anaerobic    Blood culture x two cultures. Draw prior to antibiotics. [[id number]] Collected: 01/07/22 1507    Order Status: Completed Specimen: Blood from Peripheral, Antecubital, Left Updated: 01/08/22 1632     Blood Culture, Routine No Growth to date      No Growth to date    Narrative:      Aerobic and anaerobic          Impression:  Active Hospital Problems    Diagnosis  POA    *Acute on chronic respiratory failure with hypoxia [J96.21]  Unknown      Resolved Hospital Problems   No resolved problems to display.               Plan:     - continue supplemental oxygen to keep sats >92%  - prone as tolerated  - CXR repeat in am  - lasix 40mg IV now  - continue inhaled bronchodilators  - incentive spirometry   - continue RDV  - continue decadron 6mg daily  - if not improving consider actemra  - blood sugar, BP control per hospitalist  - monitor inflammatory markers  - prophylactic lovenox recommended if no clear contraindication    Janiya Stephenson MD  Pulmonary & Critical Care Medicine

## 2022-01-09 NOTE — PLAN OF CARE
01/09/22 0845   Patient Assessment/Suction   Level of Consciousness (AVPU) alert   Respiratory Effort Normal;Unlabored   Expansion/Accessory Muscles/Retractions no use of accessory muscles;no retractions   All Lung Fields Breath Sounds diminished   PRE-TX-O2   O2 Device (Oxygen Therapy) Vapotherm   $ Is the patient on High Flow Oxygen? Yes   $ Vapotherm Daily Charge Vapotherm Daily   Flow (L/min) 30   Oxygen Concentration (%) 80   SpO2 (!) 92 %   Pulse Oximetry Type Continuous   $ Pulse Oximetry - Multiple Charge Pulse Oximetry - Multiple   Pulse 85   Resp (!) 23   Inhaler   $ Inhaler Charges PRN treatment not required   Education   $ Education 15 min  (oxygen)   Respiratory Evaluation   $ Care Plan Tech Time 30 min   $ Eval/Re-eval Charges Re-evaluation

## 2022-01-09 NOTE — PLAN OF CARE
Problem: Adult Inpatient Plan of Care  Goal: Plan of Care Review  Outcome: Ongoing, Progressing  Goal: Patient-Specific Goal (Individualized)  Outcome: Ongoing, Progressing  Goal: Absence of Hospital-Acquired Illness or Injury  Outcome: Ongoing, Progressing  Goal: Optimal Comfort and Wellbeing  Outcome: Ongoing, Progressing  Goal: Readiness for Transition of Care  Outcome: Ongoing, Progressing     Problem: Fall Injury Risk  Goal: Absence of Fall and Fall-Related Injury  Outcome: Ongoing, Progressing     Problem: Fluid Imbalance (Pneumonia)  Goal: Fluid Balance  Outcome: Ongoing, Progressing     Problem: Infection (Pneumonia)  Goal: Resolution of Infection Signs and Symptoms  Outcome: Ongoing, Progressing     Problem: Respiratory Compromise (Pneumonia)  Goal: Effective Oxygenation and Ventilation  Outcome: Ongoing, Progressing

## 2022-01-09 NOTE — PLAN OF CARE
[facility]  Initial Discharge Assessment       Primary Care Provider: Marcelino Leal MD    Admission Diagnosis: Acute on chronic respiratory failure with hypoxia [J96.21]    Admission Date: 1/7/2022  Expected Discharge Date:      Discharge Barriers Identified: (P) None    Assessment was completed with patient's daughter Nelsy Lopez via telephone as patient was unable to be reached in room. Patient is currently has COVID-19 and is in isolation. Family plans for patient to return home with family support. Daughter's alternate caring for mother in her home and live close by. Plans is to resume home health services with [facility]. Discharge needs/DME is TBD. CM/SW will continue to follow for any discharge needs that may arise.     Payor: MEDICARE / Plan: MEDICARE PART A & B / Product Type: Government /     Extended Emergency Contact Information  Primary Emergency Contact: Nelsy Lopez   [facility]  Home Phone: [phone]  Relation: Daughter  Secondary Emergency Contact: Malvin Jacinto   [facility]  Home Phone: [phone]  Relation: Daughter    Discharge Plan A: (P) Home with family,Home Health  Discharge Plan B: (P) Home with family,Home Health      [id number] - [place], [place] - [address] AT [facility]  [address]  [address]  [place] [address]  Phone: [phone] Fax: [phone]      Initial Assessment (most recent)       Adult Discharge Assessment - 01/09/22 1440          Discharge Assessment    Assessment Type Discharge Planning Assessment (P)      Confirmed/corrected address, phone number and insurance Yes (P)      Confirmed Demographics Correct on Facesheet (P)      Source of Information family (P)      When was your last doctors appointment? -- (P)    Unknown    Reason For Admission Acute on chronic respiratory failure with hypoxia (P)      Lives With alone (P)    Daughters rotate staying with patient.    Do you  expect to return to your current living situation? Yes (P)      Do you have help at home or someone to help you manage your care at home? Yes (P)      Who are your caregiver(s) and their phone number(s)? Chava Bautistaguillermo Lopez and Malvin Jacinto (P)      Prior to hospitilization cognitive status: Alert/Oriented (P)    Usually alert. Prior to COVID lived by self and did everything by herself.    Current cognitive status: Unable to Assess (P)      Walking or Climbing Stairs Difficulty other (see comments) (P)    Usually no issues. Has declined since COVID dx    Dressing/Bathing Difficulty other (see comments) (P)    Usually no issues. Has declined since COVID dx    Do you have any problems with: -- (P)    Daughters run errands    Home Accessibility wheelchair accessible (P)      Home Layout Able to live on 1st floor (P)      Equipment Currently Used at Home glucometer;raised toilet;bath bench;grab bar (P)      Readmission within 30 days? No (P)      Patient currently being followed by outpatient case management? No (P)      Do you currently have service(s) that help you manage your care at home? Yes (P)      Name and Contact number of agency [facility] (P)      Is the pt/caregiver preference to resume services with current agency Yes (P)      Do you take prescription medications? Yes (P)      Do you have prescription coverage? Yes (P)      Coverage [facility] rx plan (P)      Do you have any problems affording any of your prescribed medications? No (P)      Is the patient taking medications as prescribed? yes (P)      Who is going to help you get home at discharge? Chava Nelsy and Malvin (P)      How do you get to doctors appointments? family or friend will provide (P)      Are you on dialysis? No (P)      Do you take coumadin? No (P)      Discharge Plan A Home with family;Home Health (P)      Discharge Plan B Home with family;Home Health (P)      DME Needed Upon Discharge  other (see comments) (P)    TBD     Discharge Plan discussed with: Adult children (P)      Discharge Barriers Identified None (P)

## 2022-01-10 NOTE — PROGRESS NOTES
"[facility] Medicine Progress Note  Patient Name: Peg Cabezas MRN: [id number]   Patient Class: IP- Inpatient  Length of Stay: 3   Admission Date: 1/7/2022  1:55 PM Attending Physician: Tianna Hamilton MD   Primary Care Provider: Marcelino Leal MD Face-to-Face encounter date: 01/10/2022   Chief Complaint: Shortness of Breath (Covid Positive 12/25/2021)    Assessment & Plan:   Peg Cabezas is a 81 y.o. female admitted for    Active Problems/Assessment & Plan  Acute Hypoxemic Respiratory Failure   COVID 19 Bilateral pneumonia  Chronic symptomatic anemia - s/p transfusion   Acute on chronic CKD  Diabetes mellitus type II  Hypothyroidism  Dementia    Plan  Requiring maximal support on vapotherm and needed transfer to ICU this morning  Wean oxygen   On dexamethasone, trend inflammatory markers  Started her back on lovenox  incentive spirometry   remdesivir given in [facility] from 12/25 to 12/27  Encourage ambulation, incentive spirometry    Discharge Planning:   PT C/S for debility/fraility/deconditioning and assistance in discharge needs.     Subjective:    Interval History   Patient very short of breath  More awake when I saw her in the ICU  Eating food  Desaturating on vapo therm to early 90s.   Denies chest pain, palpitations, abdominal pain, nausea/vomiting.   No concerns/issues overnight reported by the patient or the nursing staff.  Reviewed the labs and discussed the plan of care.   No family present at bedside.     Review of Systems   All other Review of Systems were found to be negative expect for that mentioned already in HPI.   Objective:   Physical Exam  /60   Pulse 64   Temp 97.6 °F (36.4 °C)   Resp 15   Ht 5' 2" (1.575 m)   Wt 72.1 kg (158 lb 15.2 oz)   SpO2 95%   BMI 29.07 kg/m²   Vitals reviewed.    Constitutional: No distress.   HENT: Atraumatic.   Cardiovascular: Normal rate, regular rhythm and normal heart sounds.   Pulmonary/Chest: Effort normal. " Clear to auscultation bilaterally. No wheezes.   Abdominal: Soft. Bowel sounds are normal. Exhibits no distension and no mass. No tenderness  Neurological: Alert.   Skin: Skin is warm and dry.     Following labs were Reviewed   Recent Labs   Lab 01/10/22  0649   WBC 7.58   HGB 11.6*   HCT 36.2*      CALCIUM 8.5*   ALBUMIN 2.7*   PROT 6.8   *   K 3.5   CO2 21*   CL 97   BUN 52*   CREATININE 1.8*   ALKPHOS 47*   ALT 23   AST 29   BILITOT 1.1*     No results found for: POCTGLUCOSE     All labs within the past 24 hours have been reviewed  Microbiology Results (last 7 days)     Procedure Component Value Units Date/Time    Blood culture x two cultures. Draw prior to antibiotics. [[id number]] Collected: 01/07/22 1450    Order Status: Completed Specimen: Blood from Peripheral, Wrist, Left Updated: 01/09/22 1632     Blood Culture, Routine No Growth to date      No Growth to date      No Growth to date    Narrative:      Aerobic and anaerobic    Blood culture x two cultures. Draw prior to antibiotics. [[id number]] Collected: 01/07/22 1507    Order Status: Completed Specimen: Blood from Peripheral, Antecubital, Left Updated: 01/09/22 1632     Blood Culture, Routine No Growth to date      No Growth to date      No Growth to date    Narrative:      Aerobic and anaerobic        X-Ray Chest AP Portable   Final Result      X-Ray Chest AP Portable   Final Result      X-Ray Chest AP Portable   Final Result          Inpatient medications  Scheduled Meds:   amLODIPine  10 mg Oral Daily    atorvastatin  20 mg Oral Daily    cholecalciferol (vitamin D3)  1 tablet Oral Daily    dexamethasone  6 mg Intravenous Daily    docusate sodium  100 mg Oral BID    donepeziL  10 mg Oral QHS    enoxparin  30 mg Subcutaneous Q24H    EScitalopram oxalate  20 mg Oral Daily    ezetimibe  10 mg Oral Daily    famotidine  10 mg Oral QHS    folic acid  1 mg Oral Daily    gabapentin  100 mg Oral BID    levothyroxine  88 mcg Oral Before  breakfast    memantine  10 mg Oral Daily    metoprolol tartrate  25 mg Oral BID     Continuous Infusions:  PRN Meds:.acetaminophen, albuterol **AND** MDI Q4H PRN, benzonatate, calcium gluconate IVPB, calcium gluconate IVPB, calcium gluconate IVPB, clonazePAM, dextrose 50%, dextrose 50%, glucagon (human recombinant), glucose, glucose, insulin aspart U-100, magnesium sulfate IVPB, magnesium sulfate IVPB, melatonin, naloxone, ondansetron, potassium chloride in water **AND** potassium chloride in water **AND** potassium chloride in water, sodium chloride 0.9%, sodium phosphate IVPB, traMADoL    Above encounter included review of the medical records, interviewing and examining the patient face-to-face, discussion with family and other health care providers, ordering and interpreting lab/test results and formulating a plan of care.     Medical Decision Making:    [] Low Complexity  [] Moderate Complexity  [x] High Complexity    Tianna Hamilton MD  [facility] Hospitalist  01/10/2022

## 2022-01-10 NOTE — PT/OT/SLP PROGRESS
Occupational Therapy      Patient Name:  Peg Cabezas   MRN:  [id number]    Patient not seen today secondary to Other (Comment) (Pt transferred to ICU). Will follow-up next service date.    1/10/2022

## 2022-01-10 NOTE — NURSING
Report called to Abel in ICU, pt transferred in bed on nonrebreather. RN at bedside during transfer.

## 2022-01-10 NOTE — PT/OT/SLP PROGRESS
Physical Therapy      Patient Name:  Peg Cabezas   MRN:  [id number]    Patient not seen today secondary to  (Pt t/f'ed to ICU). Will follow-up

## 2022-01-10 NOTE — CARE UPDATE
01/10/22 0831   Patient Assessment/Suction   Level of Consciousness (AVPU) alert   Respiratory Effort Mild   Expansion/Accessory Muscles/Retractions no use of accessory muscles   All Lung Fields Breath Sounds diminished   Rhythm/Pattern, Respiratory tachypneic   Cough Frequency infrequent   Cough Type no productive sputum   PRE-TX-O2   O2 Device (Oxygen Therapy) Vapotherm   $ Is the patient on Low Flow Oxygen? Yes   $ Vapotherm Daily Charge Vapotherm Daily   $ Vapotherm Equipment Vapotherm Circuit   Flow (L/min) 40   Oxygen Concentration (%) 100   SpO2 (!) 94 %   Pulse Oximetry Type Continuous   $ Pulse Oximetry - Multiple Charge Pulse Oximetry - Multiple   Pulse 84   Resp 20   Inhaler   $ Inhaler Charges MDI (Metered Dose Inahler) Treatment   Daily Review of Necessity (Inhaler) completed   Respiratory Treatment Status (Inhaler) given   Treatment Route (Inhaler) spacer/holding chamber   Patient Position (Inhaler) Novak's   Post Treatment Assessment (Inhaler) breath sounds unchanged   Signs of Intolerance (Inhaler) none   Ready to Wean/Extubation Screen   FIO2<=50 (chart decimal) (!) 1   Education   $ Education Bronchodilator;15 min

## 2022-01-10 NOTE — PLAN OF CARE
Problem: Adult Inpatient Plan of Care  Goal: Plan of Care Review  Outcome: Ongoing, Progressing  Goal: Patient-Specific Goal (Individualized)  Outcome: Ongoing, Progressing  Goal: Absence of Hospital-Acquired Illness or Injury  Outcome: Ongoing, Progressing  Goal: Optimal Comfort and Wellbeing  Outcome: Ongoing, Progressing  Goal: Readiness for Transition of Care  Outcome: Ongoing, Progressing     Problem: Fall Injury Risk  Goal: Absence of Fall and Fall-Related Injury  Outcome: Ongoing, Progressing     Problem: Fluid Imbalance (Pneumonia)  Goal: Fluid Balance  Outcome: Ongoing, Progressing     Problem: Infection (Pneumonia)  Goal: Resolution of Infection Signs and Symptoms  Outcome: Ongoing, Progressing     Problem: Respiratory Compromise (Pneumonia)  Goal: Effective Oxygenation and Ventilation  Outcome: Ongoing, Progressing     Problem: Skin Injury Risk Increased  Goal: Skin Health and Integrity  Outcome: Ongoing, Progressing

## 2022-01-10 NOTE — NURSING
Patient up in chair. O2 saturation 94%, 40L / 100% on vapotherm. Will continue to monitor and attempt to wean vapotherm.

## 2022-01-10 NOTE — NURSING
"Temp earlier 100.9F, see MAR, Tylenol given, Vapotherm increased to 40L at 90% to keep sats >92%, Turned semi prone on left side, tolerating well, continues to "feel miserable", will cont to monitor.  "

## 2022-01-10 NOTE — PROGRESS NOTES
Progress Note  Pulmonary/Critical Care      PATIENT NAME: Peg Cabezas  MRN: [id number]  TODAY'S DATE: 01/10/2022  12:37 PM  ADMIT DATE: 2022  AGE: 81 y.o. : 1940    HPI/INTERVAL HISTORY (See H&P for complete P,F,SHx) :   The patient is an 81-year-old female who was hospitalized [facility] [place] with COVID..  She was kept there for 2 days and then discharged.  She was readmitted on the  and kept there for few more days and discharged.  She was admitted here on the  with significant hypoxemia.  Her hypoxemia is worsening and she was transferred to the ICU this morning because she is maxed on Vapotherm.  Patient has baseline dementia which has gotten worse through her illness.  The patient was agitated this morning and was given Klonopin for this and is now very sleepy.    REVIEW OF SYSTEMS  Unable to obtain    VITAL SIGNS (MOST RECENT)  Temp: 97.6 °F (36.4 °C) (01/10/22 1101)  Pulse: 67 (01/10/22 1101)  Resp: (!) 28 (01/10/22 1101)  BP: (!) 116/59 (01/10/22 1101)  SpO2: (!) 93 % (01/10/22 1101)    INTAKE AND OUTPUT (LAST 24 HOURS):    Intake/Output Summary (Last 24 hours) at 1/10/2022 1237  Last data filed at 1/10/2022 0600  Gross per 24 hour   Intake 600 ml   Output 1425 ml   Net -825 ml       WEIGHT  Wt Readings from Last 1 Encounters:   22 72.1 kg (158 lb 15.2 oz)       PHYSICAL EXAM  GENERAL: Older patient in no distress, waking briefly when stimulated and then falling back to sleep  HEENT: Pupils equal and reactive. Extraocular movements intact. Nose intact. Pharynx moist.  NECK: Supple.   HEART: Regular rate and rhythm. No murmur or gallop auscultated.  LUNGS: Clear to auscultation and percussion. Lung excursion symmetrical. No change in fremitus. No adventitial noises.  ABDOMEN: Bowel sounds present.  She seems tender in her epigastric area.  She states she has not eaten the last few meals, no masses palpated.  : Normal anatomy.  EXTREMITIES: Normal muscle tone and joint  movement, no cyanosis or clubbing.   LYMPHATICS: No adenopathy palpated, no edema.  SKIN: Dry, intact, no lesions.   NEURO:  Too lethargic to discern but her speech awaken from moment was clear.  PSYCH:  Unable to assess    Vapotherm SETTINGS  Oxygen Concentration (%):  [] 100    40 L    LAST ARTERIAL BLOOD GAS  ABG  Recent Labs   Lab 01/08/22  0520   PH 7.385   PO2 57*   PCO2 31.9*   HCO3 19.1*   BE -6       ACUTE PHASE REACTANT (LAST 24 HOURS)  No results for input(s): FERRITIN, CRP, LDH, DDIMER in the last 24 hours.    CBC LAST (LAST 24 HOURS)  Recent Labs   Lab 01/10/22  0649   WBC 7.58   RBC 4.16   HGB 11.6*   HCT 36.2*   MCV 87   MCH 27.9   MCHC 32.0   RDW 14.4      MPV 10.6   GRAN 88.2*  6.7   LYMPH 7.8*  0.6*   MONO 3.0*  0.2*   BASO 0.01   NRBC 0       CHEMISTRY LAST (LAST 24 HOURS)  Recent Labs   Lab 01/10/22  0649   *   K 3.5   CL 97   CO2 21*   ANIONGAP 14   BUN 52*   CREATININE 1.8*   GLU 92   CALCIUM 8.5*   MG 2.1   ALBUMIN 2.7*   PROT 6.8   ALKPHOS 47*   ALT 23   AST 29   BILITOT 1.1*       CARDIAC PROFILE (LAST 24 HOURS)  Recent Labs   Lab 01/07/22  1450   *   CPK 66      TROPONINI <0.030       LAST 7 DAYS MICROBIOLOGY   Microbiology Results (last 7 days)     Procedure Component Value Units Date/Time    Blood culture x two cultures. Draw prior to antibiotics. [[id number]] Collected: 01/07/22 1450    Order Status: Completed Specimen: Blood from Peripheral, Wrist, Left Updated: 01/09/22 1632     Blood Culture, Routine No Growth to date      No Growth to date      No Growth to date    Narrative:      Aerobic and anaerobic    Blood culture x two cultures. Draw prior to antibiotics. [[id number]] Collected: 01/07/22 1507    Order Status: Completed Specimen: Blood from Peripheral, Antecubital, Left Updated: 01/09/22 1632     Blood Culture, Routine No Growth to date      No Growth to date      No Growth to date    Narrative:      Aerobic and anaerobic          MOST RECENT  IMAGING  X-Ray Chest AP Portable  Reason: sob    FINDINGS:    PA and lateral chest comparison chest x-ray January 9, 2022 show normal cardiomediastinal silhouette.  There is no significant change of bilateral hazy lung opacities, left greater than right Pulmonary vasculature is normal. No acute osseous abnormality.    IMPRESSION:    No significant change of bilateral hazy lung opacities.    Electronically signed by:  Elier Beasley DO  1/9/2022 3:26 PM CST Workstation: [id number]  X-Ray Chest AP Portable  Reason: covid    FINDINGS:    PA and lateral chest with comparison chest x-ray January 7, 2022 show normal cardiomediastinal silhouette. Bilateral interstitial lung opacities with right lung base and diffuse left lung patchy opacities are again noted, similar to previous exam.  Pulmonary vasculature is normal. No acute osseous abnormality.    IMPRESSION:    No significant change when compared with previous exam.    Electronically signed by:  Elier Beasley DO  1/9/2022 7:20 AM CST Workstation: [id number]      CURRENT VISIT EKG  Results for orders placed or performed during the hospital encounter of 01/07/22   EKG 12-lead    Narrative    Test Reason : U07.1,    Vent. Rate : 066 BPM     Atrial Rate : 066 BPM     P-R Int : 168 ms          QRS Dur : 110 ms      QT Int : 432 ms       P-R-T Axes : 061 007 066 degrees     QTc Int : 452 ms    Normal sinus rhythm  Incomplete left bundle branch block  Borderline Abnormal ECG  No previous ECGs available  Confirmed by Abelino Soriano MD ([id number]) on 1/7/2022 6:30:17 PM    Referred By: [email or web address]   SELF           Confirmed By:Abelino Soriano MD       ECHOCARDIOGRAM RESULTS  No results found for this or any previous visit.        ASSESSMENT/PLAN:     Active Hospital Problems    Diagnosis  POA    *Acute on chronic respiratory failure with hypoxia [J96.21]  Unknown      Resolved Hospital Problems   No resolved problems to display.     COVID-19 pneumonia  Acute hypoxemic respiratory  failure  Dementia  Chronic anemia  Acute on chronic kidney disease with metabolic acidosis  Diabetes mellitus  Hyponatremia  Moderate hypoalbuminemia    Continue Decadron  As soon as the Klonopin wears off will get the patient up out of bed  Discussed with daughters her code status and both agree that they wish her to be a DNR/ DNI  3 more days of isolation  GI prophylaxis  Trend labs  Repeat ferritin, D-dimer, and CRP in a.m.      Lina Gan MD  [facility]  Department of Pulmonology  Date of Service: 01/10/2022  12:37 PM

## 2022-01-11 NOTE — PLAN OF CARE
Plan of care reviewed and patient progressing toward goals. Patient confused at times but is easily re-directed. She is able to sit up in a chair and lay on her side. Patient was able to feed herself dinner and ate approximately 70%. Will continue to monitor.

## 2022-01-11 NOTE — PROGRESS NOTES
"[facility]  Adult Nutrition   Progress Note (Initial Assessment)     SUMMARY     Recommendations/Interventions:  1. Continue current diet as tolerated ,encourage intake.   2. Continue ONS, Glucerna Therapeutic Shake TID (660 kcals, 30 g protein) to assist in meeting needs when meal intake is insufficient.   3. Provide assistance during meal times as needed.    Goals: 1. Patient to meet at least 75% of estimated needs through meal and supplement intake. 2. Meal assistance provided as needed by patient.  Nutrition Goal Status: new    Dietitian Rounds Brief:  · Length of Stay. Patient admitted 2' COVID-19 with acute on chrinic respiratory failure with hypoxia. Patient transferred to ICU on max vapotherm. Appetite and intake good prior to transfer to ICU-50-75% of meals. Will continue to monitor intake, labs, and plan of care.  Reason for Assessment  Reason For Assessment: length of stay,identified at risk by screening criteria (ICU Status)  Diagnosis:  (COVID-19-acute on chronic respiratory failure with hypoxia)  Relevant Medical History: N/A  Interdisciplinary Rounds: attended    Nutrition Risk Screen  Nutrition Risk Screen: no indicators present     MST Score: 0  Have you recently lost weight without trying?: No  Weight loss score: 0  Have you been eating poorly because of a decreased appetite?: No  Appetite score: 0     Nutrition/Diet History  Spiritual, Cultural Beliefs, Gnosticism Practices, Values that Affect Care: no  Food Allergies: NKFA  Factors Affecting Nutritional Intake: decreased appetite (on max vapotherm)    Anthropometrics  Temp: 99 °F (37.2 °C)  Height Method: Stated  Height: 5' 2" (157.5 cm)  Height (inches): 62 in  Weight Method: Bed Scale  Weight: 72.1 kg (158 lb 15.2 oz)  Weight (lb): 158.95 lb  Ideal Body Weight (IBW), Female: 110 lb  % Ideal Body Weight, Female (lb): 144.5 %  BMI (Calculated): 29.1  BMI Grade: 25 - 29.9 - overweight     Weight History:  Wt Readings from Last 10 " Encounters:   01/07/22 72.1 kg (158 lb 15.2 oz)   11/17/17 68 kg (150 lb)   10/06/17 68 kg (150 lb)   09/22/17 68 kg (150 lb)   08/18/17 68 kg (150 lb)   07/14/17 68 kg (150 lb)   06/23/17 68 kg (150 lb)     Lab/Procedures/Meds: Pertinent Labs Reviewed  Clinical Chemistry:  Recent Labs   Lab 01/11/22  0323   *   K 4.2   CL 99   CO2 20*   *   BUN 64*   CREATININE 1.9*   CALCIUM 8.5*   PROT 6.5   ALBUMIN 2.5*   BILITOT 0.6   ALKPHOS 51*   AST 23   ALT 21   ANIONGAP 9   ESTGFRAFRICA 27.7*   EGFRNONAA 24.0*   MG 2.3     CBC:   Recent Labs   Lab 01/11/22  0323   WBC 7.83   RBC 3.61*   HGB 9.8*   HCT 31.0*      MCV 86   MCH 27.1   MCHC 31.6*     Cardiac Profile:  Recent Labs   Lab 01/07/22  1450   *   CPK 66   TROPONINI <0.030     Inflammatory Labs:  Recent Labs   Lab 01/07/22  1450 01/09/22  0806 01/11/22  0323   CRP 6.72* 10.68* 33.83*     Medications: Pertinent Medications reviewed  Scheduled Meds:   amLODIPine  10 mg Oral Daily    atorvastatin  20 mg Oral Daily    chlorhexidine  15 mL Mouth/Throat BID    cholecalciferol (vitamin D3)  1 tablet Oral Daily    dexamethasone  6 mg Intravenous Daily    docusate sodium  100 mg Oral BID    donepeziL  10 mg Oral QHS    enoxparin  30 mg Subcutaneous Q24H    EScitalopram oxalate  20 mg Oral Daily    ezetimibe  10 mg Oral Daily    famotidine  10 mg Oral QHS    folic acid  1 mg Oral Daily    gabapentin  100 mg Oral BID    levothyroxine  88 mcg Oral Before breakfast    memantine  10 mg Oral Daily    metoprolol tartrate  25 mg Oral BID    mupirocin   Nasal BID     Continuous Infusions:  PRN Meds:.acetaminophen, albuterol **AND** MDI Q4H PRN, benzonatate, calcium gluconate IVPB, calcium gluconate IVPB, calcium gluconate IVPB, clonazePAM, dextrose 50%, dextrose 50%, glucagon (human recombinant), glucose, glucose, insulin aspart U-100, magnesium oxide, magnesium oxide, magnesium sulfate IVPB, magnesium sulfate IVPB, melatonin, naloxone,  ondansetron, potassium bicarbonate, potassium bicarbonate, potassium bicarbonate, potassium chloride in water **AND** potassium chloride in water **AND** potassium chloride in water, potassium, sodium phosphates, potassium, sodium phosphates, potassium, sodium phosphates, sodium chloride 0.9%, sodium phosphate IVPB, traMADoL    Antibiotics (From admission, onward)            Start     Stop Route Frequency Ordered    01/10/22 2100  mupirocin 2 % ointment  (MRSA Decolonization Orders [facility])         01/15 2059 Nasl 2 times daily 01/10/22 1623           Estimated/Assessed Needs  Weight Used For Calorie Calculations: 72.1 kg (158 lb 15.2 oz)  Energy Calorie Requirements (kcal): 5204-4029 kcals/day (20-25 kcals/kg)  Energy Need Method: Kcal/kg  Protein Requirements: 72-87 g/day (1.0-1.2 g/kg)  Weight Used For Protein Calculations: 72.1 kg (158 lb 15.2 oz)  Fluid Requirements (mL): 1 mL/kcal or per MD     RDA Method (mL): 1442       Nutrition Prescription Ordered    Current Diet Order: 1800 Kcal Diabetic Diet  Oral Nutrition Supplement: Glucerna Therapeutic Shake TID (660 kcals, 30 g protein)    Evaluation of Received Nutrient/Fluid Intake    Energy Calories Required: not meeting needs  Protein Required: not meeting needs  Fluid Required: meeting needs  Tolerance: tolerating  % Intake of Estimated Energy Needs: 50 - 75 %   % Meal Intake: 50 - 75 %     Intake/Output Summary (Last 24 hours) at 1/11/2022 0844  Last data filed at 1/11/2022 0745  Gross per 24 hour   Intake 780 ml   Output --   Net 780 ml      Nutrition Risk    Level of Risk/Frequency of Follow-up: moderate    Monitor and Evaluation    Food and Nutrient Intake: energy intake,food and beverage intake  Food and Nutrient Adminstration: diet order  Physical Activity and Function: nutrition-related ADLs and IADLs,factors affecting access to physical activity  Anthropometric Measurements: weight,weight change,body mass index  Biochemical Data, Medical Tests and  Procedures: electrolyte and renal panel,gastrointestinal profile,glucose/endocrine profile,inflammatory profile,lipid profile  Nutrition-Focused Physical Findings: overall appearance     Nutrition Follow-Up    RD Follow-up?: Yes  Claire Wei RD 01/11/2022 12:10 PM

## 2022-01-11 NOTE — PLAN OF CARE
Problem: Occupational Therapy Goal  Goal: Occupational Therapy Goal  Description: Goals to be met by: discharge     Patient will increase functional independence with ADLs by performing:    Feeding with Modified [place].  UE Dressing with Supervision.  LE Dressing with Minimal Assistance.  Grooming while seated at sink with Stand-by Assistance.  Toileting from bedside commode with Contact Guard Assistance for hygiene and clothing management.   Toilet transfer to bedside commode with Contact Guard Assistance.  Increased functional strength to 4+5 for B UE reaching during ADL tasks.    Outcome: Ongoing, Progressing

## 2022-01-11 NOTE — PLAN OF CARE
Problem: Physical Therapy Goal  Goal: Physical Therapy Goal  Description: Goals to be met by: Discharge     Patient will increase functional independence with mobility by performin. Supine to sit with Supervision  2. Sit to stand transfer with Supervision  3. Bed to chair transfer with Supervision using Rolling Walker  4. Gait  x 100 feet with Supervision using Rolling Walker.         Outcome: Ongoing, Progressing

## 2022-01-11 NOTE — PT/OT/SLP PROGRESS
Occupational Therapy   Treatment    Name: Peg Cabezas  MRN: [id number]  Admitting Diagnosis:  Acute on chronic respiratory failure with hypoxia       Recommendations:     Discharge Recommendations: nursing facility, skilled  Discharge Equipment Recommendations:  other (see comments) (TBD at next level of care)  Barriers to discharge:       Assessment:     Peg Cabezas is a 81 y.o. female with a medical diagnosis of Acute on chronic respiratory failure with hypoxia.  Pt agreeable to OT therapy session this AM. Performance deficits affecting function are weakness,impaired endurance,impaired self care skills,impaired functional mobilty,gait instability,impaired balance,decreased safety awareness,pain,impaired cardiopulmonary response to activity. Pt on vapotherm 40L at 80%. After completion of oral care, pt's sats dropped to 87%, but quickly danielle back up >90% with PLB technique.     Rehab Prognosis:  Fair; patient would benefit from acute skilled OT services to address these deficits and reach maximum level of function.       Plan:     Patient to be seen 5 x/week to address the above listed problems via self-care/home management,therapeutic activities,therapeutic exercises  · Plan of Care Expires: 02/09/22  · Plan of Care Reviewed with: patient    Subjective     Pain/Comfort:  · Pain Rating 1: 8/10  · Location 1:  (headache)  · Pain Addressed 1: Nurse notified    Objective:     Communicated with: nursing prior to session.  Patient found up in chair with peripheral IV,telemetry,blood pressure cuff,PureWick,oxygen (vapotherm) upon OT entry to room.    General Precautions: Standard, airborne,contact,droplet,fall   Orthopedic Precautions:N/A   Braces: N/A  Respiratory Status: vapotherm 40L 80%     Occupational Performance:     Activities of Daily Living:  · Grooming: setup assistance seated in chair to brush teeth and to wash face      Therapeutic Exercises:  BUE ROM exercises x 10 reps in all major planes with SBA  (after initial demo from OT) while seated EOB; pt tolerated well    Treatment & Education:  Pt educated on role of OT/POC, importance of OOB/EOB activity, use of call bell, and safety during ADLs    Patient left up in chair with all lines intact, call button in reach and RN notifiedEducation:      GOALS:   Multidisciplinary Problems     Occupational Therapy Goals        Problem: Occupational Therapy Goal    Goal Priority Disciplines Outcome Interventions   Occupational Therapy Goal     OT, PT/OT Ongoing, Progressing    Description: Goals to be met by: discharge     Patient will increase functional independence with ADLs by performing:    Feeding with Modified [place].  UE Dressing with Supervision.  LE Dressing with Minimal Assistance.  Grooming while seated at sink with Stand-by Assistance.  Toileting from bedside commode with Contact Guard Assistance for hygiene and clothing management.   Toilet transfer to bedside commode with Contact Guard Assistance.  Increased functional strength to 4+5 for B UE reaching during ADL tasks.                     Time Tracking:     OT Date of Treatment: 01/11/22  OT Start Time: 1000  OT Stop Time: 1019  OT Total Time (min): 19 min    Billable Minutes:Therapeutic Exercise 19    OT/CLARA: OT          1/11/2022

## 2022-01-11 NOTE — HOSPITAL COURSE
Dr Apodaca assumed pt's care   No acute events    no acute events overnight.  Patient remains on Vapotherm.  Patient remains in ICU pulmonary following   no acute events overnight patient still requiring Vapotherm.  Pulmonary following.   discussed case with Pulmonary and patient's daughters.  Daughters are at bedside.  Family wished to transition to comfort care.  They would like to discontinue Vapotherm.  Case discussed with nurse.     patient  at 5:10 p.m. with loving  family at bedside

## 2022-01-11 NOTE — SUBJECTIVE & OBJECTIVE
Interval History:   No acute events overnight.  Case discussed with nurse  Objective:     Vital Signs (Most Recent):  Temp: 99.1 °F (37.3 °C) (01/11/22 1101)  Pulse: 65 (01/11/22 1101)  Resp: 18 (01/11/22 1101)  BP: (!) 122/57 (01/11/22 1101)  SpO2: (!) 94 % (01/11/22 1101) Vital Signs (24h Range):  Temp:  [97.5 °F (36.4 °C)-99.1 °F (37.3 °C)] 99.1 °F (37.3 °C)  Pulse:  [61-79] 65  Resp:  [18-32] 18  SpO2:  [88 %-100 %] 94 %  BP: (111-151)/(55-66) 122/57     Weight: 72.1 kg (158 lb 15.2 oz)  Body mass index is 29.07 kg/m².    Intake/Output Summary (Last 24 hours) at 1/11/2022 1615  Last data filed at 1/11/2022 1200  Gross per 24 hour   Intake 1120 ml   Output --   Net 1120 ml      Physical ExamLying in bed NAD  Lungs symmetrical expansion no wheeze  Heart regular rate rhythm  Abdomen is nondistended  Extremities no edema no deformities  Neuro patient moves all extremities    Significant Labs:   All pertinent labs within the past 24 hours have been reviewed.  BMP:   Recent Labs   Lab 01/11/22 0323   *   *   K 4.2   CL 99   CO2 20*   BUN 64*   CREATININE 1.9*   CALCIUM 8.5*   MG 2.3     CBC:   Recent Labs   Lab 01/10/22  0649 01/11/22 0323   WBC 7.58 7.83   HGB 11.6* 9.8*   HCT 36.2* 31.0*    218     CMP:   Recent Labs   Lab 01/10/22  0649 01/11/22 0323   * 128*   K 3.5 4.2   CL 97 99   CO2 21* 20*   GLU 92 135*   BUN 52* 64*   CREATININE 1.8* 1.9*   CALCIUM 8.5* 8.5*   PROT 6.8 6.5   ALBUMIN 2.7* 2.5*   BILITOT 1.1* 0.6   ALKPHOS 47* 51*   AST 29 23   ALT 23 21   ANIONGAP 14 9   EGFRNONAA 25.7* 24.0*       Significant Imaging:

## 2022-01-11 NOTE — PT/OT/SLP PROGRESS
Physical Therapy Treatment    Patient Name:  Peg Cabezas   MRN:  [id number]    Recommendations:     Discharge Recommendations:  nursing facility, skilled   Discharge Equipment Recommendations: other (see comments) (TBD at next level of care)   Barriers to discharge: desat with limited mobility and increase assist with mobility    Assessment:     Peg Cabezas is a 81 y.o. female admitted with a medical diagnosis of Acute on chronic respiratory failure with hypoxia.  She presents with the following impairments/functional limitations:  weakness,impaired endurance,impaired self care skills,impaired functional mobilty,gait instability,impaired balance,decreased lower extremity function,decreased safety awareness,pain,impaired cardiopulmonary response to activity.    Pt found on 40L 80%. SAO2 WFL. Pt agreeable to visit. Pt up in chair upon arrival. Sit to stand after VI for proper HP with mod A to RW. Ambulated 5 ft forward and backward with RW and min A forward mod A backward x 2. First attempt on 40L 80% desat to 85% increase to 40L and 100% for recovery with VI for pursed lip breathing. Maintain SAO2 greater than 90% with second ambulation on 40L 100%. Sit to stand with emphasis on HP x 2 trials limited due to shortness of breath. At end of session pt tolerated return to 40L 80%    Rehab Prognosis: Fair; patient would benefit from acute skilled PT services to address these deficits and reach maximum level of function.    Recent Surgery: * No surgery found *      Plan:     During this hospitalization, patient to be seen 6 x/week to address the identified rehab impairments via gait training,therapeutic activities,therapeutic exercises,neuromuscular re-education and progress toward the following goals:    · Plan of Care Expires:  02/11/22    Subjective     Chief Complaint: shortness of breath  Patient/Family Comments/goals: get better  Pain/Comfort:  · Pain Rating 1: other (see comments) (did not  quantify)      Objective:     Communicated with RN prior to session.  Patient found up in chair with peripheral IV,telemetry,blood pressure cuff,PureWick,oxygen (vapotherm) upon PT entry to room.     General Precautions: Standard, fall,droplet,contact,airborne   Orthopedic Precautions:N/A   Braces: N/A  Respiratory Status: High flow, flow 40 L/min, concentration 80%     Functional Mobility:  · Transfers:     · Sit to Stand:  moderate assistance with rolling walker  · Gait: 5 ft forward with min A and backward with mod A and RW x 2      AM-PAC 6 CLICK MOBILITY          Therapeutic Activities and Exercises:   Pt educated on POC, discharge recommendation, pursed lip breathing, proper HP with transfers, importance of time OOB, need for O2 at this time, need for assist with mobility, use of call bell to seek assistance as needed and fall prevention      Patient left up in chair with all lines intact, call button in reach and RN notified..    GOALS:   Multidisciplinary Problems     Physical Therapy Goals        Problem: Physical Therapy Goal    Goal Priority Disciplines Outcome Goal Variances Interventions   Physical Therapy Goal     PT, PT/OT Ongoing, Progressing     Description: Goals to be met by: Discharge     Patient will increase functional independence with mobility by performin. Supine to sit with Supervision  2. Sit to stand transfer with Supervision  3. Bed to chair transfer with Supervision using Rolling Walker  4. Gait  x 100 feet with Supervision using Rolling Walker.                          Time Tracking:     PT Received On: 22  PT Start Time: 1131     PT Stop Time: 1148  PT Total Time (min): 17 min     Billable Minutes: Gait Training 17    Treatment Type: Treatment  PT/PTA: PT     PTA Visit Number: 0     2022

## 2022-01-11 NOTE — PROGRESS NOTES
[facility]  Progress Note    Patient Name: Peg Cabezas  MRN: [id number]  Patient Class: IP- Inpatient   Admission Date: 1/7/2022  Length of Stay: 4 days  Attending Physician: Omar Apodaca DO  Primary Care Provider: Marcelino Leal MD        Subjective:     Principal Problem:Acute on chronic respiratory failure with hypoxia        HPI:  No notes on file    Overview/Hospital Course:  1/11 Dr Apodaca assumed pt's care   No acute events       Interval History:   No acute events overnight.  Case discussed with nurse  Objective:     Vital Signs (Most Recent):  Temp: 99.1 °F (37.3 °C) (01/11/22 1101)  Pulse: 65 (01/11/22 1101)  Resp: 18 (01/11/22 1101)  BP: (!) 122/57 (01/11/22 1101)  SpO2: (!) 94 % (01/11/22 1101) Vital Signs (24h Range):  Temp:  [97.5 °F (36.4 °C)-99.1 °F (37.3 °C)] 99.1 °F (37.3 °C)  Pulse:  [61-79] 65  Resp:  [18-32] 18  SpO2:  [88 %-100 %] 94 %  BP: (111-151)/(55-66) 122/57     Weight: 72.1 kg (158 lb 15.2 oz)  Body mass index is 29.07 kg/m².    Intake/Output Summary (Last 24 hours) at 1/11/2022 1615  Last data filed at 1/11/2022 1200  Gross per 24 hour   Intake 1120 ml   Output --   Net 1120 ml      Physical ExamLying in bed NAD  Lungs symmetrical expansion no wheeze  Heart regular rate rhythm  Abdomen is nondistended  Extremities no edema no deformities  Neuro patient moves all extremities    Significant Labs:   All pertinent labs within the past 24 hours have been reviewed.  BMP:   Recent Labs   Lab 01/11/22  0323   *   *   K 4.2   CL 99   CO2 20*   BUN 64*   CREATININE 1.9*   CALCIUM 8.5*   MG 2.3     CBC:   Recent Labs   Lab 01/10/22  0649 01/11/22  0323   WBC 7.58 7.83   HGB 11.6* 9.8*   HCT 36.2* 31.0*    218     CMP:   Recent Labs   Lab 01/10/22  0649 01/11/22  0323   * 128*   K 3.5 4.2   CL 97 99   CO2 21* 20*   GLU 92 135*   BUN 52* 64*   CREATININE 1.8* 1.9*   CALCIUM 8.5* 8.5*   PROT 6.8 6.5   ALBUMIN 2.7* 2.5*   BILITOT  1.1* 0.6   ALKPHOS 47* 51*   AST 29 23   ALT 23 21   ANIONGAP 14 9   EGFRNONAA 25.7* 24.0*       Significant Imaging:       Assessment/Plan:   #1 Acute hypoxic respiratory failure  #2 Bilateral COVID pneumonia  #3 Chronic symptomatic anemia  #4 Acute/chronic kidney disease  #5 DM2  #6 Dementia    Vapotherm  wean as tolerated  Continuing dexamethasone  Trending inflammatory markers   Status post remdesivir 12/25-12/27    VTE Risk Mitigation (From admission, onward)         Ordered     enoxaparin injection 30 mg  Every 24 hours (non-standard times)         01/09/22 0945     IP VTE HIGH RISK PATIENT  Once         01/07/22 2015     Place sequential compression device  Until discontinued         01/07/22 2015                Discharge Planning   CHRISTIAN: 1/15/2022     Code Status: DNR   Is the patient medically ready for discharge?:     Reason for patient still in hospital (select all that apply): Patient trending condition  Discharge Plan A: Home with family,Home Health                  Omar Apodaca DO  Department of Hospital Medicine   [facility]

## 2022-01-11 NOTE — PROGRESS NOTES
Progress Note  Pulmonary/Critical Care      PATIENT NAME: Peg Cabezas  MRN: [id number]  TODAY'S DATE: 2022  12:37 PM  ADMIT DATE: 2022  AGE: 81 y.o. : 1940    HPI/INTERVAL HISTORY (See H&P for complete P,F,SHx) :   The patient is an 81-year-old female who was hospitalized [facility] with COVID..  She was kept there for 2 days and then discharged.  She was readmitted on the  and kept there for few more days and discharged.  She was admitted here on the  with significant hypoxemia.  Her hypoxemia is worsening and she was transferred to the ICU this morning because she is maxed on Vapotherm.  Patient has baseline dementia which has gotten worse through her illness.  The patient was agitated this morning and was given Klonopin for this and is now very sleepy.     the patient is unhappy sitting in a chair.  She wants get back to bed.  Explained again, the need for her to be up in the chair.  She is on maximum Vapotherm.  She is not eating.    REVIEW OF SYSTEMS    General: Feeling tired  Eyes: Vision is good.  ENT:  No sinusitis or pharyngitis.   Heart: No chest pain or palpitations.  Lungs:  She short of breath and coughing.  GI:  She is not hungry.  She may be a little nauseated.  : No dysuria, hesitancy, or nocturia.  Skin: No lesions or rashes.  Musculoskeletal:  Her back hurts.  Neuro: No headaches or neuropathy.  Lymph: No edema or adenopathy.  Psych: No anxiety or depression.  Endo: No weight change.          VITAL SIGNS (MOST RECENT)  Temp: 98.8 °F (37.1 °C) (22 1501)  Pulse: 72 (22 1501)  Resp: (!) 21 (22 1501)  BP: 137/65 (22 1501)  SpO2: 95 % (22 1501)    INTAKE AND OUTPUT (LAST 24 HOURS):    Intake/Output Summary (Last 24 hours) at 2022 1645  Last data filed at 2022 1200  Gross per 24 hour   Intake 1120 ml   Output --   Net 1120 ml       WEIGHT  Wt Readings from Last 1 Encounters:   22 72.1 kg (158 lb 15.2 oz)        PHYSICAL EXAM  GENERAL: Older patient in the chair.  HEENT: Pupils equal and reactive. Extraocular movements intact. Nose intact. Pharynx moist.  NECK: Supple.   HEART: Regular rate and rhythm. No murmur or gallop auscultated.  LUNGS:  There are bibasilar crackles.. Lung excursion symmetrical. No change in fremitus. No adventitial noises.  ABDOMEN: Bowel sounds present.  Nontender.  : Normal anatomy.  EXTREMITIES: Normal muscle tone and joint movement, no cyanosis or clubbing.   LYMPHATICS: No adenopathy palpated, no edema.  SKIN: Dry, intact, no lesions.   NEURO:  Cranial nerves are intact.  Motor strength is 5/5 bilaterally.  PSYCH:  Unhappy.    Vapotherm SETTINGS  Oxygen Concentration (%):  [] 80    40 L, 75%    LAST ARTERIAL BLOOD GAS  ABG  Recent Labs   Lab 01/08/22  0520   PH 7.385   PO2 57*   PCO2 31.9*   HCO3 19.1*   BE -6       ACUTE PHASE REACTANT (LAST 24 HOURS)  Recent Labs   Lab 01/11/22  0323   FERRITIN 1,286*   CRP 33.83*   DDIMER 1.03*       CBC LAST (LAST 24 HOURS)  Recent Labs   Lab 01/11/22  0323   WBC 7.83   RBC 3.61*   HGB 9.8*   HCT 31.0*   MCV 86   MCH 27.1   MCHC 31.6*   RDW 14.1      MPV 10.5   GRAN 90.4*  7.1   LYMPH 6.1*  0.5*   MONO 2.9*  0.2*   BASO 0.00   NRBC 0       CHEMISTRY LAST (LAST 24 HOURS)  Recent Labs   Lab 01/11/22  0323   *   K 4.2   CL 99   CO2 20*   ANIONGAP 9   BUN 64*   CREATININE 1.9*   *   CALCIUM 8.5*   MG 2.3   ALBUMIN 2.5*   PROT 6.5   ALKPHOS 51*   ALT 21   AST 23   BILITOT 0.6       CARDIAC PROFILE (LAST 24 HOURS)  Recent Labs   Lab 01/07/22  1450   *   CPK 66      TROPONINI <0.030       LAST 7 DAYS MICROBIOLOGY   Microbiology Results (last 7 days)     Procedure Component Value Units Date/Time    Blood culture x two cultures. Draw prior to antibiotics. [[id number]] Collected: 01/07/22 1450    Order Status: Completed Specimen: Blood from Peripheral, Wrist, Left Updated: 01/11/22 6609     Blood Culture, Routine No  Growth to date      No Growth to date      No Growth to date      No Growth to date      No Growth to date    Narrative:      Aerobic and anaerobic    Blood culture x two cultures. Draw prior to antibiotics. [[id number]] Collected: 01/07/22 1507    Order Status: Completed Specimen: Blood from Peripheral, Antecubital, Left Updated: 01/11/22 1632     Blood Culture, Routine No Growth to date      No Growth to date      No Growth to date      No Growth to date      No Growth to date    Narrative:      Aerobic and anaerobic          MOST RECENT IMAGING  X-Ray Chest AP Portable  Reason: sob    FINDINGS:    PA and lateral chest comparison chest x-ray January 9, 2022 show normal cardiomediastinal silhouette.  There is no significant change of bilateral hazy lung opacities, left greater than right Pulmonary vasculature is normal. No acute osseous abnormality.    IMPRESSION:    No significant change of bilateral hazy lung opacities.    Electronically signed by:  Elier Beasley DO  1/9/2022 3:26 PM CST Workstation: [id number]  X-Ray Chest AP Portable  Reason: covid    FINDINGS:    PA and lateral chest with comparison chest x-ray January 7, 2022 show normal cardiomediastinal silhouette. Bilateral interstitial lung opacities with right lung base and diffuse left lung patchy opacities are again noted, similar to previous exam.  Pulmonary vasculature is normal. No acute osseous abnormality.    IMPRESSION:    No significant change when compared with previous exam.    Electronically signed by:  Elier Beasley DO  1/9/2022 7:20 AM CST Workstation: [id number]      CURRENT VISIT EKG  Results for orders placed or performed during the hospital encounter of 01/07/22   EKG 12-lead    Narrative    Test Reason : U07.1,    Vent. Rate : 066 BPM     Atrial Rate : 066 BPM     P-R Int : 168 ms          QRS Dur : 110 ms      QT Int : 432 ms       P-R-T Axes : 061 007 066 degrees     QTc Int : 452 ms    Normal sinus rhythm  Incomplete left bundle  branch block  Borderline Abnormal ECG  No previous ECGs available  Confirmed by Abelino Soriano MD ([id number]) on 1/7/2022 6:30:17 PM    Referred By: MONE   SELF           Confirmed By:Abelino Soriano MD       ECHOCARDIOGRAM RESULTS  No results found for this or any previous visit.        ASSESSMENT/PLAN:     Active Hospital Problems    Diagnosis  POA    *Acute on chronic respiratory failure with hypoxia [J96.21]  Unknown      Resolved Hospital Problems   No resolved problems to display.     COVID-19 pneumonia, 1st positive on December 25th, hospitalized at [facility] twice, status post Remdesivir  Acute hypoxemic respiratory failure  Dementia  Chronic anemia, worsening  Acute on chronic kidney disease with metabolic acidosis and prerenal azotemia  Diabetes mellitus  Hyponatremia, worsening  Moderate hypoalbuminemia  Worsening acute phase reactants with ferritin up over a 1000 now    Continue Decadron  GI prophylaxis  Trend labs  Repeat ferritin, D-dimer, and CRP qod  Keep patient out of bed as much as possible        Lina Gan MD  [facility]  Department of Pulmonology  Date of Service: 01/11/2022  12:37 PM

## 2022-01-11 NOTE — PLAN OF CARE
01/11/22 0900   Patient Assessment/Suction   Level of Consciousness (AVPU) alert   Respiratory Effort Normal;Unlabored   Expansion/Accessory Muscles/Retractions expansion symmetric;no retractions;no use of accessory muscles   All Lung Fields Breath Sounds diminished;equal bilaterally   Rhythm/Pattern, Respiratory unlabored;pattern regular;depth regular   Cough Frequency infrequent   Cough Type nonproductive;good   PRE-TX-O2   O2 Device (Oxygen Therapy) Vapotherm   $ Vapotherm Daily Charge Vapotherm Daily   Humidification temp set 34   Humidification temp actual 34   Flow (L/min) 40   Oxygen Concentration (%) 80   Pulse Oximetry Type Continuous   $ Pulse Oximetry - Multiple Charge Pulse Oximetry - Multiple   Ready to Wean/Extubation Screen   FIO2<=50 (chart decimal) (!) 0.8   Education   $ Education DME Oxygen;15 min

## 2022-01-11 NOTE — CARE UPDATE
01/10/22 6178   Patient Assessment/Suction   Level of Consciousness (AVPU) alert   Respiratory Effort Mild   Expansion/Accessory Muscles/Retractions expansion symmetric;no retractions;no use of accessory muscles   All Lung Fields Breath Sounds diminished   Rhythm/Pattern, Respiratory pattern regular;unlabored   PRE-TX-O2   O2 Device (Oxygen Therapy) Vapotherm   Flow (L/min) 30   Oxygen Concentration (%) 95   SpO2 95 %   Pulse Oximetry Type Continuous   $ Pulse Oximetry - Multiple Charge Pulse Oximetry - Multiple   Pulse 63   Resp (!) 22   Ready to Wean/Extubation Screen   FIO2<=50 (chart decimal) (!) 0.95   Respiratory Evaluation   $ Care Plan Tech Time 30 min   $ Eval/Re-eval Charges Re-evaluation   Evaluation For   (care plan)

## 2022-01-12 NOTE — PT/OT/SLP PROGRESS
Occupational Therapy   Treatment    Name: Peg Cabezas  MRN: [id number]  Admitting Diagnosis:  Acute on chronic respiratory failure with hypoxia       Recommendations:     Discharge Recommendations: nursing facility, skilled  Discharge Equipment Recommendations:  other (see comments) (TBD at next level of care)  Barriers to discharge:       Assessment:     Peg Cabezas is a 81 y.o. female with a medical diagnosis of Acute on chronic respiratory failure with hypoxia.  Pt agreeable to OT therapy session this AM. Performance deficits affecting function are weakness,impaired endurance,impaired functional mobilty,impaired self care skills,gait instability,impaired balance,impaired cognition,decreased upper extremity function,decreased lower extremity function,decreased safety awareness,pain,impaired cardiopulmonary response to activity. Pt required cues during session for energy conservation/pacing techniques during session. Pt's sats dropped to 88% on 40L 95% during oral care task, but pt quickly recovered with PLB back to 90's.     Rehab Prognosis:  Fair; patient would benefit from acute skilled OT services to address these deficits and reach maximum level of function.       Plan:     Patient to be seen 3 x/week to address the above listed problems via self-care/home management,therapeutic activities,therapeutic exercises  · Plan of Care Expires: 02/09/22  · Plan of Care Reviewed with: patient    Subjective     Pain/Comfort:  · Pain Rating 1:  (not rated)  · Location 1: back  · Pain Addressed 1: Reposition,Distraction    Objective:     Communicated with: nursing prior to session.  Patient found up in chair with peripheral IV,telemetry,blood pressure cuff,oxygen,pulse ox (continuous) (vapotherm) upon OT entry to room.    General Precautions: Standard, airborne,contact,droplet,fall   Orthopedic Precautions:N/A   Braces: N/A  Respiratory Status: vapotherm 40L 95%     Occupational Performance:     Activities of Daily  Living:  · Grooming: setup assistance seated in chair to brush teeth and to wash face; pt required cues for pacing during tasks      Therapeutic Exercises:  BUE exercises x 10 reps in all major planes with minimum assistance for correct technique/posture; pt required multiple rest breaks during reps due to decreased activity tolerance    Treatment & Education:  Pt educated on role of OT/POC, importance of OOB/EOB activity, use of call bell, energy conservation/pacing techniques, PLB technique, and safety during ADLs and exercises.     Patient left up in chair with all lines intact and call button in reachEducation:      GOALS:   Multidisciplinary Problems     Occupational Therapy Goals        Problem: Occupational Therapy Goal    Goal Priority Disciplines Outcome Interventions   Occupational Therapy Goal     OT, PT/OT Ongoing, Progressing    Description: Goals to be met by: discharge     Patient will increase functional independence with ADLs by performing:    Feeding with Modified [place].  UE Dressing with Supervision.  LE Dressing with Minimal Assistance.  Grooming while seated at sink with Stand-by Assistance.  Toileting from bedside commode with Contact Guard Assistance for hygiene and clothing management.   Toilet transfer to bedside commode with Contact Guard Assistance.  Increased functional strength to 4+5 for B UE reaching during ADL tasks.                     Time Tracking:     OT Date of Treatment: 01/12/22  OT Start Time: 1020  OT Stop Time: 1036  OT Total Time (min): 16 min    Billable Minutes:Self Care/Home Management 16    OT/CLARA: OT          1/12/2022

## 2022-01-12 NOTE — SUBJECTIVE & OBJECTIVE
Interval History:  Patient remains in ICU on Vapotherm  Pulmonary following  No acute events overnight  Objective:     Vital Signs (Most Recent):  Temp: 97.4 °F (36.3 °C) (01/12/22 1101)  Pulse: 71 (01/12/22 1101)  Resp: (!) 24 (01/12/22 1101)  BP: (!) 112/53 (01/12/22 1101)  SpO2: (!) 93 % (01/12/22 1101) Vital Signs (24h Range):  Temp:  [97.2 °F (36.2 °C)-98.1 °F (36.7 °C)] 97.4 °F (36.3 °C)  Pulse:  [59-85] 71  Resp:  [19-29] 24  SpO2:  [88 %-95 %] 93 %  BP: (112-171)/(53-70) 112/53     Weight: 72.1 kg (158 lb 15.2 oz)  Body mass index is 29.07 kg/m².    Intake/Output Summary (Last 24 hours) at 1/12/2022 1507  Last data filed at 1/12/2022 1230  Gross per 24 hour   Intake 1120 ml   Output --   Net 1120 ml      Physical Exam  Patient appears in no acute distress.  She is sitting in a chair  Appears better than yesterday  HEENT sclerae nonicteric  Neck is supple nontender  Lung symmetrical expansion moderate effort no wheeze  Heart regular rate rhythm  Abdomen is soft nontender  Extremities no edema no deformities  Neuro patient is alert oriented x3 motor exam is nonfocal  Psych patient is calm cooperative    Significant Labs:   All pertinent labs within the past 24 hours have been reviewed.  BMP:   Recent Labs   Lab 01/12/22 0312      *   K 4.2   CL 99   CO2 19*   BUN 62*   CREATININE 1.5*   CALCIUM 8.9   MG 2.4     CBC:   Recent Labs   Lab 01/11/22 0323 01/12/22 0312   WBC 7.83 8.80   HGB 9.8* 10.1*   HCT 31.0* 30.6*    219     CMP:   Recent Labs   Lab 01/11/22 0323 01/12/22 0312   * 130*   K 4.2 4.2   CL 99 99   CO2 20* 19*   * 106   BUN 64* 62*   CREATININE 1.9* 1.5*   CALCIUM 8.5* 8.9   PROT 6.5 6.8   ALBUMIN 2.5* 2.4*   BILITOT 0.6 0.8   ALKPHOS 51* 60   AST 23 43*   ALT 21 38   ANIONGAP 9 12   EGFRNONAA 24.0* 32.0*       Significant Imaging:

## 2022-01-12 NOTE — CARE UPDATE
01/12/22 0213   Patient Assessment/Suction   Level of Consciousness (AVPU)   (sleeping)   Respiratory Effort Unlabored   Expansion/Accessory Muscles/Retractions expansion symmetric;no retractions;no use of accessory muscles   All Lung Fields Breath Sounds diminished   Rhythm/Pattern, Respiratory pattern regular;unlabored   PRE-TX-O2   O2 Device (Oxygen Therapy) Vapotherm   Flow (L/min) 40   Oxygen Concentration (%) 80   SpO2 (!) 92 %   Pulse 64   Resp 20   Inhaler   $ Inhaler Charges PRN treatment not required   Ready to Wean/Extubation Screen   FIO2<=50 (chart decimal) (!) 0.8

## 2022-01-12 NOTE — PT/OT/SLP PROGRESS
"Physical Therapy Treatment    Patient Name:  Peg Cabezas   MRN:  [id number]    Recommendations:     Discharge Recommendations:  nursing facility, skilled   Discharge Equipment Recommendations: other (see comments) (TBD)   Barriers to discharge: increase assist with mobility and desat with limited mobility    Assessment:     Peg Cabezas is a 81 y.o. female admitted with a medical diagnosis of Acute on chronic respiratory failure with hypoxia.  She presents with the following impairments/functional limitations:  weakness,impaired endurance,impaired self care skills,impaired functional mobilty,gait instability,impaired balance,decreased lower extremity function,decreased safety awareness,impaired cardiopulmonary response to activity,pain.    Pt found on 40L 95%. SAO2 WFL. Pt agreeable to visit. Pt up in chair upon arrival. Sit to stand after VI for proper HP with mod A and HHA. Pt with complaint of back pain. Standing tolerance limited due to desat to 85%. Increase to 40L and 100%. Emphasis on PLB at rest. Sit to stand with 40L 100% desat to 85% and required prolonged rest break to improve to greater than 90%. Tolerates seated TE with SAO2 greater than 90% but reports getting "winded" during seated TE. Returnt o 40L 95% at end of session 92%    Rehab Prognosis: Fair; patient would benefit from acute skilled PT services to address these deficits and reach maximum level of function.    Recent Surgery: * No surgery found *      Plan:     During this hospitalization, patient to be seen 6 x/week to address the identified rehab impairments via gait training,therapeutic activities,therapeutic exercises,neuromuscular re-education and progress toward the following goals:    · Plan of Care Expires:  02/12/22    Subjective     Chief Complaint: not feeling well  Patient/Family Comments/goals: go back to bed  Pain/Comfort:  · Pain Rating 1: other (see comments) (did not quantify)  · Location 1: back  · Pain Addressed 1: " Reposition,Distraction,Cessation of Activity      Objective:     Communicated with RN prior to session.  Patient found up in chair with peripheral IV,telemetry,blood pressure cuff,oxygen,pulse ox (continuous) (vapotherm) upon PT entry to room.     General Precautions: Standard, airborne,fall,droplet,contact   Orthopedic Precautions:N/A   Braces: N/A  Respiratory Status: High flow, flow 40 L/min, concentration 95%     Functional Mobility:  · Transfers:     · Sit to Stand:  moderate assistance with hand-held assist      AM-PAC 6 CLICK MOBILITY          Therapeutic Activities and Exercises:   Pt educated on POC, discharge recommendation, pursed lip breathing, proper HP with transfers, importance of time OOB, need for O2 at this time, need for assist with mobility, use of call bell to seek assistance as needed and fall prevention    Pt tolerated seated TE including hip flexion, knee ext, ankle df 1 set of 10 each with min vi for proper form and pacing for optimal strengthening and emphasis on pursed lip breathing thoughout to maintain optimal oxygenation      Patient left up in chair with all lines intact and call button in reach..    GOALS:   Multidisciplinary Problems     Physical Therapy Goals        Problem: Physical Therapy Goal    Goal Priority Disciplines Outcome Goal Variances Interventions   Physical Therapy Goal     PT, PT/OT Ongoing, Progressing     Description: Goals to be met by: Discharge     Patient will increase functional independence with mobility by performin. Supine to sit with Supervision  2. Sit to stand transfer with Supervision  3. Bed to chair transfer with Supervision using Rolling Walker  4. Gait  x 100 feet with Supervision using Rolling Walker.                          Time Tracking:     PT Received On: 22  PT Start Time: 1133     PT Stop Time: 1145  PT Total Time (min): 12 min     Billable Minutes: Therapeutic Activity 12    Treatment Type: Treatment  PT/PTA: PT     PTA Visit  Number: 0     01/12/2022

## 2022-01-12 NOTE — PROGRESS NOTES
[facility]  Progress Note    Patient Name: Peg Cabezas  MRN: [id number]  Patient Class: IP- Inpatient   Admission Date: 1/7/2022  Length of Stay: 5 days  Attending Physician: Omar Apodaca DO  Primary Care Provider: Marcelino Leal MD        Subjective:     Principal Problem:Acute on chronic respiratory failure with hypoxia        HPI:  No notes on file    Overview/Hospital Course:  1/11 Dr Apodaca assumed pt's care   No acute events   1/12 no acute events overnight.  Patient remains on Vapotherm.  Patient remains in ICU pulmonary following      Interval History:  Patient remains in ICU on Vapotherm  Pulmonary following  No acute events overnight  Objective:     Vital Signs (Most Recent):  Temp: 97.4 °F (36.3 °C) (01/12/22 1101)  Pulse: 71 (01/12/22 1101)  Resp: (!) 24 (01/12/22 1101)  BP: (!) 112/53 (01/12/22 1101)  SpO2: (!) 93 % (01/12/22 1101) Vital Signs (24h Range):  Temp:  [97.2 °F (36.2 °C)-98.1 °F (36.7 °C)] 97.4 °F (36.3 °C)  Pulse:  [59-85] 71  Resp:  [19-29] 24  SpO2:  [88 %-95 %] 93 %  BP: (112-171)/(53-70) 112/53     Weight: 72.1 kg (158 lb 15.2 oz)  Body mass index is 29.07 kg/m².    Intake/Output Summary (Last 24 hours) at 1/12/2022 1507  Last data filed at 1/12/2022 1230  Gross per 24 hour   Intake 1120 ml   Output --   Net 1120 ml      Physical Exam  Patient appears in no acute distress.  She is sitting in a chair  Appears better than yesterday  HEENT sclerae nonicteric  Neck is supple nontender  Lung symmetrical expansion moderate effort no wheeze  Heart regular rate rhythm  Abdomen is soft nontender  Extremities no edema no deformities  Neuro patient is alert oriented x3 motor exam is nonfocal  Psych patient is calm cooperative    Significant Labs:   All pertinent labs within the past 24 hours have been reviewed.  BMP:   Recent Labs   Lab 01/12/22  0312      *   K 4.2   CL 99   CO2 19*   BUN 62*   CREATININE 1.5*   CALCIUM 8.9   MG 2.4     CBC:    Recent Labs   Lab 01/11/22 0323 01/12/22 0312   WBC 7.83 8.80   HGB 9.8* 10.1*   HCT 31.0* 30.6*    219     CMP:   Recent Labs   Lab 01/11/22 0323 01/12/22 0312   * 130*   K 4.2 4.2   CL 99 99   CO2 20* 19*   * 106   BUN 64* 62*   CREATININE 1.9* 1.5*   CALCIUM 8.5* 8.9   PROT 6.5 6.8   ALBUMIN 2.5* 2.4*   BILITOT 0.6 0.8   ALKPHOS 51* 60   AST 23 43*   ALT 21 38   ANIONGAP 9 12   EGFRNONAA 24.0* 32.0*       Significant Imaging:       Assessment/Plan:   #1 Acute hypoxic respiratory failure  #2 Bilateral COVID pneumonia  #3 Chronic symptomatic anemia  #4 Acute/chronic kidney disease-improved  #5 DM2  #6 Dementia  #7 Hyponatremia-improved         Vapotherm  wean as tolerated  Continuing dexamethasone  Trending inflammatory markers which are now worse   Status post remdesivir 12/25-12/27  Patient's overall prognosis is poor  Appreciate help from Pulmonary    VTE Risk Mitigation (From admission, onward)         Ordered     enoxaparin injection 30 mg  Every 24 hours (non-standard times)         01/09/22 0945     IP VTE HIGH RISK PATIENT  Once         01/07/22 2015     Place sequential compression device  Until discontinued         01/07/22 2015                Discharge Planning   CHRISTIAN: 1/16/2022     Code Status: DNR   Is the patient medically ready for discharge?:     Reason for patient still in hospital (select all that apply): Patient trending condition  Discharge Plan A: Home with family,Home Health                  Omar Apodaca DO  Department of Hospital Medicine   [facility]

## 2022-01-12 NOTE — PROGRESS NOTES
Progress Note  Pulmonary/Critical Care      PATIENT NAME: Peg Cabezas  MRN: [id number]  TODAY'S DATE: 2022  12:37 PM  ADMIT DATE: 2022  AGE: 81 y.o. : 1940    HPI/INTERVAL HISTORY (See H&P for complete P,F,SHx) :   The patient is an 81-year-old female who was hospitalized [facility] at [place] with COVID..  She was kept there for 2 days and then discharged.  She was readmitted on the  and kept there for few more days and discharged.  She was admitted here on the  with significant hypoxemia.  Her hypoxemia is worsening and she was transferred to the ICU this morning because she is maxed on Vapotherm.  Patient has baseline dementia which has gotten worse through her illness.  The patient was agitated this morning and was given Klonopin for this and is now very sleepy.     the patient is unhappy sitting in a chair.  She wants get back to bed.  Explained again, the need for her to be up in the chair.  She is on maximum Vapotherm.  She is not eating.     the patient took off her oxygen and her sats dropped to 48. This is the 2nd time today.  She does not feel well.  She is eating some food.      REVIEW OF SYSTEMS    General: Feeling bad.  Eyes: Vision is good.  ENT:  No sinusitis or pharyngitis.   Heart: No chest pain or palpitations.  Lungs:  She short of breath and coughing.  GI:  She is not hungry.    : No dysuria, hesitancy, or nocturia.  Skin: No lesions or rashes.  Musculoskeletal:  Her back hurts.  She wants to get in bed.  Neuro: No headaches or neuropathy.  Lymph: No edema or adenopathy.  Psych: No anxiety or depression.  Endo: No weight change.          VITAL SIGNS (MOST RECENT)  Temp: 97.2 °F (36.2 °C) (22 0701)  Pulse: 85 (22 0840)  Resp: (!) 29 (22 1001)  BP: (!) 124/58 (22 0840)  SpO2: (!) 93 % (22 0701)    INTAKE AND OUTPUT (LAST 24 HOURS):    Intake/Output Summary (Last 24 hours) at 2022 1035  Last data filed at 2022 0743  Gross  per 24 hour   Intake 1060 ml   Output --   Net 1060 ml       WEIGHT  Wt Readings from Last 1 Encounters:   01/07/22 72.1 kg (158 lb 15.2 oz)       PHYSICAL EXAM  GENERAL: Older patient in the chair.  HEENT: Pupils equal and reactive. Extraocular movements intact. Nose intact. Pharynx moist.  NECK: Supple.   HEART: Regular rate and rhythm. No murmur or gallop auscultated.  LUNGS:  There are bibasilar crackles.. Lung excursion symmetrical. No change in fremitus. No adventitial noises.  ABDOMEN: Bowel sounds present.  Nontender.  : Normal anatomy.  EXTREMITIES: Normal muscle tone and joint movement, no cyanosis or clubbing.   LYMPHATICS: No adenopathy palpated, no edema.  SKIN: Dry, intact, no lesions.   NEURO:  Cranial nerves are intact.  Motor strength is 5/5 bilaterally.  PSYCH:  Unhappy.    Vapotherm SETTINGS  Oxygen Concentration (%):  [] 100    40 L, 95%    LAST ARTERIAL BLOOD GAS  ABG  Recent Labs   Lab 01/08/22  0520   PH 7.385   PO2 57*   PCO2 31.9*   HCO3 19.1*   BE -6       CBC LAST (LAST 24 HOURS)  Recent Labs   Lab 01/12/22  0312   WBC 8.80   RBC 3.66*   HGB 10.1*   HCT 30.6*   MCV 84   MCH 27.6   MCHC 33.0   RDW 13.6      MPV 10.2   GRAN 89.9*  7.9*   LYMPH 5.8*  0.5*   MONO 3.3*  0.3   BASO 0.01   NRBC 0       CHEMISTRY LAST (LAST 24 HOURS)  Recent Labs   Lab 01/12/22  0312   *   K 4.2   CL 99   CO2 19*   ANIONGAP 12   BUN 62*   CREATININE 1.5*      CALCIUM 8.9   MG 2.4   ALBUMIN 2.4*   PROT 6.8   ALKPHOS 60   ALT 38   AST 43*   BILITOT 0.8       CARDIAC PROFILE (LAST 24 HOURS)  Recent Labs   Lab 01/07/22  1450   *   CPK 66      TROPONINI <0.030       LAST 7 DAYS MICROBIOLOGY   Microbiology Results (last 7 days)     Procedure Component Value Units Date/Time    Blood culture x two cultures. Draw prior to antibiotics. [[id number]] Collected: 01/07/22 1450    Order Status: Completed Specimen: Blood from Peripheral, Wrist, Left Updated: 01/11/22 6825     Blood  Culture, Routine No Growth to date      No Growth to date      No Growth to date      No Growth to date      No Growth to date    Narrative:      Aerobic and anaerobic    Blood culture x two cultures. Draw prior to antibiotics. [[id number]] Collected: 01/07/22 1507    Order Status: Completed Specimen: Blood from Peripheral, Antecubital, Left Updated: 01/11/22 1632     Blood Culture, Routine No Growth to date      No Growth to date      No Growth to date      No Growth to date      No Growth to date    Narrative:      Aerobic and anaerobic          MOST RECENT IMAGING  X-Ray Chest AP Portable  Reason: sob    FINDINGS:    PA and lateral chest comparison chest x-ray January 9, 2022 show normal cardiomediastinal silhouette.  There is no significant change of bilateral hazy lung opacities, left greater than right Pulmonary vasculature is normal. No acute osseous abnormality.    IMPRESSION:    No significant change of bilateral hazy lung opacities.    Electronically signed by:  Elier Beasley DO  1/9/2022 3:26 PM CST Workstation: [id number]  X-Ray Chest AP Portable  Reason: covid    FINDINGS:    PA and lateral chest with comparison chest x-ray January 7, 2022 show normal cardiomediastinal silhouette. Bilateral interstitial lung opacities with right lung base and diffuse left lung patchy opacities are again noted, similar to previous exam.  Pulmonary vasculature is normal. No acute osseous abnormality.    IMPRESSION:    No significant change when compared with previous exam.    Electronically signed by:  Elier Beasley DO  1/9/2022 7:20 AM CST Workstation: [id number]      CURRENT VISIT EKG  Results for orders placed or performed during the hospital encounter of 01/07/22   EKG 12-lead    Narrative    Test Reason : U07.1,    Vent. Rate : 066 BPM     Atrial Rate : 066 BPM     P-R Int : 168 ms          QRS Dur : 110 ms      QT Int : 432 ms       P-R-T Axes : 061 007 066 degrees     QTc Int : 452 ms    Normal sinus  rhythm  Incomplete left bundle branch block  Borderline Abnormal ECG  No previous ECGs available  Confirmed by Abelino Soriano MD ([id number]) on 1/7/2022 6:30:17 PM    Referred By: MONE   SELF           Confirmed By:Abelino Soriano MD       ECHOCARDIOGRAM RESULTS  No results found for this or any previous visit.        ASSESSMENT/PLAN:     Active Hospital Problems    Diagnosis  POA    *Acute on chronic respiratory failure with hypoxia [J96.21]  Unknown      Resolved Hospital Problems   No resolved problems to display.     COVID-19 pneumonia, 1st positive on December 25th, hospitalized at [place] twice, status post Remdesivir  Acute hypoxemic respiratory failure  Dementia  Chronic anemia, worsening  Acute on chronic kidney disease with metabolic acidosis and prerenal azotemia  Diabetes mellitus  Hyponatremia,  Moderate hypoalbuminemia  Worsening acute phase reactants with ferritin up over a 1000 now    Continue Decadron  GI prophylaxis  Trend labs  Repeat ferritin, D-dimer, and CRP qod  Keep patient out of bed as much as possible  Tape oxygen to the patient's face to try to keep it in place as she drops dramatically and quickly.  Will try to set up a zoom call with the patient's daughters        Lina Gan MD  [facility]  Department of Pulmonology  Date of Service: 01/12/2022  12:37 PM

## 2022-01-13 NOTE — SUBJECTIVE & OBJECTIVE
Interval History:     Objective:     Vital Signs (Most Recent):  Temp: 97.5 °F (36.4 °C) (01/13/22 1101)  Pulse: 71 (01/13/22 1400)  Resp: (!) 21 (01/13/22 1400)  BP: 139/66 (01/13/22 1400)  SpO2: (!) 94 % (01/13/22 1400) Vital Signs (24h Range):  Temp:  [97.5 °F (36.4 °C)-98.2 °F (36.8 °C)] 97.5 °F (36.4 °C)  Pulse:  [58-81] 71  Resp:  [13-29] 21  SpO2:  [90 %-96 %] 94 %  BP: (124-150)/(58-67) 139/66     Weight: 72.1 kg (158 lb 15.2 oz)  Body mass index is 29.07 kg/m².    Intake/Output Summary (Last 24 hours) at 1/13/2022 1549  Last data filed at 1/13/2022 1200  Gross per 24 hour   Intake 860 ml   Output --   Net 860 ml      Physical Exam patient appears in no distress.  She is lying in bed.  HEENT sclerae nonicteric  Neck  supple nontender  Lungs Symmetrical expansion no wheeze, moderate effort  Heart regular rate rhythm  Abdomen is soft nontender  Extremities no  edema no deformities  Neuro alert moves all extremities equally      Significant Labs:   All pertinent labs within the past 24 hours have been reviewed.  BMP:   Recent Labs   Lab 01/13/22  0341      *   K 4.3   CL 99   CO2 19*   BUN 57*   CREATININE 1.5*   CALCIUM 9.0   MG 2.5     CBC:   Recent Labs   Lab 01/12/22  0312 01/13/22  0341   WBC 8.80 8.35   HGB 10.1* 9.9*   HCT 30.6* 31.2*    217     CMP:   Recent Labs   Lab 01/12/22  0312 01/13/22  0341   * 131*   K 4.2 4.3   CL 99 99   CO2 19* 19*    109   BUN 62* 57*   CREATININE 1.5* 1.5*   CALCIUM 8.9 9.0   PROT 6.8 6.7   ALBUMIN 2.4* 2.5*   BILITOT 0.8 0.7   ALKPHOS 60 61   AST 43* 27   ALT 38 35   ANIONGAP 12 13   EGFRNONAA 32.0* 32.0*       Significant Imaging:

## 2022-01-13 NOTE — PLAN OF CARE
01/12/22 1908   Patient Assessment/Suction   Level of Consciousness (AVPU) alert   Respiratory Effort Normal;Unlabored   Expansion/Accessory Muscles/Retractions no use of accessory muscles   All Lung Fields Breath Sounds clear   Rhythm/Pattern, Respiratory unlabored;pattern regular;depth regular   PRE-TX-O2   O2 Device (Oxygen Therapy) Vapotherm   $ Is the patient on High Flow Oxygen? Yes   $ Vapotherm Daily Charge Vapotherm Daily   Humidification temp set 33   Humidification temp actual 33   Flow (L/min) 40   Oxygen Concentration (%) 90   SpO2 (!) 94 %   Pulse Oximetry Type Continuous   $ Pulse Oximetry - Multiple Charge Pulse Oximetry - Multiple   Pulse 72   Resp (!) 23   Inhaler   $ Inhaler Charges PRN treatment not required   Equipment Change   $ RT Equipment sterile water  (for inhalation)   Ready to Wean/Extubation Screen   FIO2<=50 (chart decimal) (!) 0.9   Education   $ Education DME Oxygen;15 min   Respiratory Evaluation   $ Care Plan Tech Time 15 min

## 2022-01-13 NOTE — PROGRESS NOTES
Progress Note  Pulmonary/Critical Care      PATIENT NAME: Peg Cabezas  MRN: [id number]  TODAY'S DATE: 2022  12:37 PM  ADMIT DATE: 2022  AGE: 81 y.o. : 1940    HPI/INTERVAL HISTORY (See H&P for complete P,F,SHx) :   The patient is an 81-year-old female who was hospitalized [facility] at [place] with COVID..  She was kept there for 2 days and then discharged.  She was readmitted on the  and kept there for few more days and discharged.  She was admitted here on the  with significant hypoxemia.  Her hypoxemia is worsening and she was transferred to the ICU this morning because she is maxed on Vapotherm.  Patient has baseline dementia which has gotten worse through her illness.  The patient was agitated this morning and was given Klonopin for this and is now very sleepy.     the patient is unhappy sitting in a chair.  She wants get back to bed.  Explained again, the need for her to be up in the chair.  She is on maximum Vapotherm.  She is not eating.     the patient took off her oxygen and her sats dropped to 48. This is the 2nd time today.  She does not feel well.  She is eating some food.       the patient continues to call continuously on the call light and call out for help.  She is very unhappy.  She does not remember visiting with her daughters on zoom yesterday    REVIEW OF SYSTEMS    General: Feeling bad.  Eyes: Vision is good.  ENT:  No sinusitis or pharyngitis.   Heart: No chest pain or palpitations.  Lungs:  She short of breath and coughing.  GI:  She is not hungry.    : No dysuria, hesitancy, or nocturia.  Skin: No lesions or rashes.  Musculoskeletal:  She states everything hurts.  She wants to get in bed.  Neuro: No headaches or neuropathy.  Lymph: No edema or adenopathy.  Psych: No anxiety or depression.  Endo: No weight change.          VITAL SIGNS (MOST RECENT)  Temp: 97.7 °F (36.5 °C) (22 0701)  Pulse: 72 (22 0820)  Resp: 13 (22 0817)  BP:  139/60 (01/13/22 0820)  SpO2: (!) 90 % (01/13/22 0800)    INTAKE AND OUTPUT (LAST 24 HOURS):    Intake/Output Summary (Last 24 hours) at 1/13/2022 0913  Last data filed at 1/13/2022 0600  Gross per 24 hour   Intake 790 ml   Output --   Net 790 ml       WEIGHT  Wt Readings from Last 1 Encounters:   01/07/22 72.1 kg (158 lb 15.2 oz)       PHYSICAL EXAM  GENERAL: Older patient in the chair.  HEENT: Pupils equal and reactive. Extraocular movements intact. Nose intact. Pharynx moist.  NECK: Supple.   HEART: Regular rate and rhythm. No murmur or gallop auscultated.  LUNGS:  There are bibasilar crackles.. Lung excursion symmetrical. No change in fremitus. No adventitial noises.  ABDOMEN: Bowel sounds present.  Nontender.  : Normal anatomy.  EXTREMITIES: Normal muscle tone and joint movement, no cyanosis or clubbing.   LYMPHATICS: No adenopathy palpated, no edema.  SKIN: Dry, intact, no lesions.   NEURO:  Cranial nerves are intact.  Motor strength is 5/5 bilaterally.  PSYCH:  Unhappy.    Vapotherm SETTINGS  Oxygen Concentration (%):  [] 100    40 L, 100%    LAST ARTERIAL BLOOD GAS  ABG  Recent Labs   Lab 01/08/22  0520   PH 7.385   PO2 57*   PCO2 31.9*   HCO3 19.1*   BE -6       CBC LAST (LAST 24 HOURS)  Recent Labs   Lab 01/13/22  0341   WBC 8.35   RBC 3.61*   HGB 9.9*   HCT 31.2*   MCV 86   MCH 27.4   MCHC 31.7*   RDW 13.7      MPV 11.2   GRAN 91.5*  7.6   LYMPH 5.5*  0.5*   MONO 1.8*  0.2*   BASO 0.01   NRBC 0       CHEMISTRY LAST (LAST 24 HOURS)  Recent Labs   Lab 01/13/22  0341   *   K 4.3   CL 99   CO2 19*   ANIONGAP 13   BUN 57*   CREATININE 1.5*      CALCIUM 9.0   MG 2.5   ALBUMIN 2.5*   PROT 6.7   ALKPHOS 61   ALT 35   AST 27   BILITOT 0.7       CARDIAC PROFILE (LAST 24 HOURS)  Recent Labs   Lab 01/07/22  1450   *   CPK 66      TROPONINI <0.030       LAST 7 DAYS MICROBIOLOGY   Microbiology Results (last 7 days)     Procedure Component Value Units Date/Time    Blood  culture x two cultures. Draw prior to antibiotics. [[id number]] Collected: 01/07/22 1507    Order Status: Completed Specimen: Blood from Peripheral, Antecubital, Left Updated: 01/12/22 1632     Blood Culture, Routine No growth after 5 days.    Narrative:      Aerobic and anaerobic    Blood culture x two cultures. Draw prior to antibiotics. [[id number]] Collected: 01/07/22 1450    Order Status: Completed Specimen: Blood from Peripheral, Wrist, Left Updated: 01/12/22 1632     Blood Culture, Routine No growth after 5 days.    Narrative:      Aerobic and anaerobic          MOST RECENT IMAGING  X-Ray Chest AP Portable  Reason: sob    FINDINGS:    PA and lateral chest comparison chest x-ray January 9, 2022 show normal cardiomediastinal silhouette.  There is no significant change of bilateral hazy lung opacities, left greater than right Pulmonary vasculature is normal. No acute osseous abnormality.    IMPRESSION:    No significant change of bilateral hazy lung opacities.    Electronically signed by:  Elier Beasley DO  1/9/2022 3:26 PM CST Workstation: [id number]  X-Ray Chest AP Portable  Reason: covid    FINDINGS:    PA and lateral chest with comparison chest x-ray January 7, 2022 show normal cardiomediastinal silhouette. Bilateral interstitial lung opacities with right lung base and diffuse left lung patchy opacities are again noted, similar to previous exam.  Pulmonary vasculature is normal. No acute osseous abnormality.    IMPRESSION:    No significant change when compared with previous exam.    Electronically signed by:  Elier Beasley DO  1/9/2022 7:20 AM CST Workstation: [id number]      CURRENT VISIT EKG  Results for orders placed or performed during the hospital encounter of 01/07/22   EKG 12-lead    Narrative    Test Reason : U07.1,    Vent. Rate : 066 BPM     Atrial Rate : 066 BPM     P-R Int : 168 ms          QRS Dur : 110 ms      QT Int : 432 ms       P-R-T Axes : 061 007 066 degrees     QTc Int : 452  ms    Normal sinus rhythm  Incomplete left bundle branch block  Borderline Abnormal ECG  No previous ECGs available  Confirmed by Abelino Soriano MD ([id number]) on 1/7/2022 6:30:17 PM    Referred By: MONE   SELF           Confirmed By:Abelino Soriano MD       ECHOCARDIOGRAM RESULTS  No results found for this or any previous visit.        ASSESSMENT/PLAN:     Active Hospital Problems    Diagnosis  POA    *Acute on chronic respiratory failure with hypoxia [J96.21]  Unknown      Resolved Hospital Problems   No resolved problems to display.     COVID-19 pneumonia, 1st positive on December 25th, hospitalized at [place] twice, status post Remdesivir  Acute hypoxemic respiratory failure on maximum Vapotherm  Dementia  Chronic anemia, worsening  Acute on chronic kidney disease with metabolic acidosis and prerenal azotemia  Diabetes mellitus  Hyponatremia, likely mild SIADH secondary to her pneumonia  Moderate hypoalbuminemia      Continue Decadron  GI prophylaxis  Trend labs  Repeat ferritin, D-dimer, and CRP  Keep patient out of bed as much as possible  Tramadol and Tylenol for pain  Hopefully can continue zoom calls with her daughters to distract her for a few minutes a day            Lina Gan MD  [facility]  Department of Pulmonology  Date of Service: 01/13/2022  12:37 PM

## 2022-01-13 NOTE — PROGRESS NOTES
[facility]  Progress Note    Patient Name: Peg Cabezas  MRN: [id number]  Patient Class: IP- Inpatient   Admission Date: 1/7/2022  Length of Stay: 6 days  Attending Physician: Omar Apodaca DO  Primary Care Provider: Marcelino Leal MD        Subjective:     Principal Problem:Acute on chronic respiratory failure with hypoxia        HPI:  No notes on file    Overview/Hospital Course:  1/11 Dr Apodaca assumed pt's care   No acute events   1/12 no acute events overnight.  Patient remains on Vapotherm.  Patient remains in ICU pulmonary following  1/13 no acute events overnight patient still requiring Vapotherm.  Pulmonary following.        Interval History:     Objective:     Vital Signs (Most Recent):  Temp: 97.5 °F (36.4 °C) (01/13/22 1101)  Pulse: 71 (01/13/22 1400)  Resp: (!) 21 (01/13/22 1400)  BP: 139/66 (01/13/22 1400)  SpO2: (!) 94 % (01/13/22 1400) Vital Signs (24h Range):  Temp:  [97.5 °F (36.4 °C)-98.2 °F (36.8 °C)] 97.5 °F (36.4 °C)  Pulse:  [58-81] 71  Resp:  [13-29] 21  SpO2:  [90 %-96 %] 94 %  BP: (124-150)/(58-67) 139/66     Weight: 72.1 kg (158 lb 15.2 oz)  Body mass index is 29.07 kg/m².    Intake/Output Summary (Last 24 hours) at 1/13/2022 1549  Last data filed at 1/13/2022 1200  Gross per 24 hour   Intake 860 ml   Output --   Net 860 ml      Physical Exam patient appears in no distress.  She is lying in bed.  HEENT sclerae nonicteric  Neck  supple nontender  Lungs Symmetrical expansion no wheeze, moderate effort  Heart regular rate rhythm  Abdomen is soft nontender  Extremities no  edema no deformities  Neuro alert moves all extremities equally      Significant Labs:   All pertinent labs within the past 24 hours have been reviewed.  BMP:   Recent Labs   Lab 01/13/22  0341      *   K 4.3   CL 99   CO2 19*   BUN 57*   CREATININE 1.5*   CALCIUM 9.0   MG 2.5     CBC:   Recent Labs   Lab 01/12/22  0312 01/13/22  0341   WBC 8.80 8.35   HGB 10.1* 9.9*   HCT  30.6* 31.2*    217     CMP:   Recent Labs   Lab 01/12/22  0312 01/13/22  0341   * 131*   K 4.2 4.3   CL 99 99   CO2 19* 19*    109   BUN 62* 57*   CREATININE 1.5* 1.5*   CALCIUM 8.9 9.0   PROT 6.8 6.7   ALBUMIN 2.4* 2.5*   BILITOT 0.8 0.7   ALKPHOS 60 61   AST 43* 27   ALT 38 35   ANIONGAP 12 13   EGFRNONAA 32.0* 32.0*       Significant Imaging:       Assessment/Plan:      #1 Acute hypoxic respiratory failure  #2 Bilateral COVID pneumonia  #3 Chronic symptomatic anemia  #4 Acute/chronic kidney disease-improved  #5 DM2  #6 Dementia  #7 Hyponatremia-improved        Continuing Vapotherm wean as tolerated  Appreciate help from Pulmonary  Trending  labs  VTE Risk Mitigation (From admission, onward)         Ordered     enoxaparin injection 30 mg  Every 24 hours (non-standard times)         01/09/22 0945     IP VTE HIGH RISK PATIENT  Once         01/07/22 2015     Place sequential compression device  Until discontinued         01/07/22 2015                Discharge Planning   CHRISTIAN: 1/16/2022     Code Status: DNR   Is the patient medically ready for discharge?:     Reason for patient still in hospital (select all that apply): Patient trending condition  Discharge Plan A: Home with family,Home Health                  Omar Apodaca DO  Department of Hospital Medicine   [facility]

## 2022-01-14 NOTE — PROGRESS NOTES
Family wishes for the patient to be comfortable.  Will discontinue extraneous meds and focus on comfort.

## 2022-01-14 NOTE — PLAN OF CARE
01/14/22 1035   Discharge Reassessment   Assessment Type Discharge Planning Reassessment   Did the patient's condition or plan change since previous assessment? No   Discharge Plan discussed with:   (chart review)   Communicated CHRISTIAN with patient/caregiver Date not available/Unable to determine   Discharge Plan A Comfort care/withdrawal   Discharge Plan B Inpatient Hospice   DME Needed Upon Discharge  none   Discharge Barriers Identified None   Why the patient remains in the hospital Requires continued medical care   Post-Acute Status   Discharge Delays None known at this time

## 2022-01-14 NOTE — PROGRESS NOTES
Progress Note  Pulmonary/Critical Care      PATIENT NAME: Peg Cabezas  MRN: [id number]  TODAY'S DATE: 2022  12:37 PM  ADMIT DATE: 2022  AGE: 81 y.o. : 1940    HPI/INTERVAL HISTORY (See H&P for complete P,F,SHx) :   The patient is an 81-year-old female who was hospitalized [facility] with COVID..  She was kept there for 2 days and then discharged.  She was readmitted on the  and kept there for few more days and discharged.  She was admitted here on the  with significant hypoxemia.  Her hypoxemia is worsening and she was transferred to the ICU this morning because she is maxed on Vapotherm.  Patient has baseline dementia which has gotten worse through her illness.  The patient was agitated this morning and was given Klonopin for this and is now very sleepy.     the patient is unhappy sitting in a chair.  She wants get back to bed.  Explained again, the need for her to be up in the chair.  She is on maximum Vapotherm.  She is not eating.     the patient took off her oxygen and her sats dropped to 48. This is the 2nd time today.  She does not feel well.  She is eating some food.       the patient continues to call continuously on the call light and call out for help.  She is very unhappy.  She does not remember visiting with her daughters on zoom yesterday     patient is sats are now in the 80s with maximum Vapotherm and 100% non-rebreather over her mouth.  She will not leave it over her mouth and nose.  We tried her on CPAP at 20 cm of pressure and her sats still did not rise and she pulled it off.    REVIEW OF SYSTEMS    General: Feeling bad.  She wants to see her daughters.  Eyes: Vision is good.  ENT:  No sinusitis or pharyngitis.   Heart: No chest pain or palpitations.  Lungs:  She short of breath and coughing.  GI:  She is not hungry.    : No dysuria, hesitancy, or nocturia.  Skin: No lesions or rashes.  Musculoskeletal:  She states everything hurts.  She  wants to get in bed.  Neuro: No headaches or neuropathy.  Lymph: No edema or adenopathy.  Psych: No anxiety or depression.  Endo: No weight change.          VITAL SIGNS (MOST RECENT)  Temp: 98.1 °F (36.7 °C) (01/14/22 0301)  Pulse: 87 (01/14/22 0601)  Resp: (!) 24 (01/14/22 0615)  BP: (!) 147/65 (01/14/22 0601)  SpO2: (!) 92 % (01/14/22 0630)    INTAKE AND OUTPUT (LAST 24 HOURS):    Intake/Output Summary (Last 24 hours) at 1/14/2022 0838  Last data filed at 1/13/2022 1715  Gross per 24 hour   Intake 530 ml   Output --   Net 530 ml       WEIGHT  Wt Readings from Last 1 Encounters:   01/07/22 72.1 kg (158 lb 15.2 oz)       PHYSICAL EXAM  GENERAL: Older patient in the chair.  HEENT: Pupils equal and reactive. Extraocular movements intact. Nose intact. Pharynx moist.  NECK: Supple.   HEART: Regular rate and rhythm. No murmur or gallop auscultated.  LUNGS:  There are bibasilar crackles.. Lung excursion symmetrical. No change in fremitus.   ABDOMEN: Bowel sounds present.  Nontender.  : Normal anatomy.  EXTREMITIES: Normal muscle tone and joint movement, no cyanosis or clubbing.   LYMPHATICS: No adenopathy palpated, no edema.  SKIN: Dry, intact, no lesions.   NEURO:  Cranial nerves are intact.  Motor strength is 5/5 bilaterally.  PSYCH:  Unhappy.    Vapotherm SETTINGS  Oxygen Concentration (%):  [100] 100    40 L, 100% and non-rebreather mask      CBC LAST (LAST 24 HOURS)  Recent Labs   Lab 01/14/22  0359   WBC 8.72   RBC 3.68*   HGB 10.1*   HCT 31.5*   MCV 86   MCH 27.4   MCHC 32.1   RDW 13.8      MPV 10.4   GRAN 92.5*  8.1*   LYMPH 4.0*  0.4*   MONO 1.7*  0.2*   BASO 0.02   NRBC 0       CHEMISTRY LAST (LAST 24 HOURS)  Recent Labs   Lab 01/14/22  0359   *   K 4.2   CL 97   CO2 20*   ANIONGAP 14   BUN 59*   CREATININE 1.5*   *   CALCIUM 9.1   MG 2.2   ALBUMIN 2.3*   PROT 6.6   ALKPHOS 68   ALT 78*   AST 61*   BILITOT 0.7       CARDIAC PROFILE (LAST 24 HOURS)  Recent Labs   Lab 01/07/22  1450   BNP  247*   CPK 66      TROPONINI <0.030       LAST 7 DAYS MICROBIOLOGY   Microbiology Results (last 7 days)     Procedure Component Value Units Date/Time    Blood culture x two cultures. Draw prior to antibiotics. [[id number]] Collected: 01/07/22 1507    Order Status: Completed Specimen: Blood from Peripheral, Antecubital, Left Updated: 01/12/22 1632     Blood Culture, Routine No growth after 5 days.    Narrative:      Aerobic and anaerobic    Blood culture x two cultures. Draw prior to antibiotics. [[id number]] Collected: 01/07/22 1450    Order Status: Completed Specimen: Blood from Peripheral, Wrist, Left Updated: 01/12/22 1632     Blood Culture, Routine No growth after 5 days.    Narrative:      Aerobic and anaerobic          MOST RECENT IMAGING  X-Ray Chest AP Portable  Reason: sob    FINDINGS:    PA and lateral chest comparison chest x-ray January 9, 2022 show normal cardiomediastinal silhouette.  There is no significant change of bilateral hazy lung opacities, left greater than right Pulmonary vasculature is normal. No acute osseous abnormality.    IMPRESSION:    No significant change of bilateral hazy lung opacities.    Electronically signed by:  Elier Beasley DO  1/9/2022 3:26 PM CST Workstation: [id number]  X-Ray Chest AP Portable  Reason: covid    FINDINGS:    PA and lateral chest with comparison chest x-ray January 7, 2022 show normal cardiomediastinal silhouette. Bilateral interstitial lung opacities with right lung base and diffuse left lung patchy opacities are again noted, similar to previous exam.  Pulmonary vasculature is normal. No acute osseous abnormality.    IMPRESSION:    No significant change when compared with previous exam.    Electronically signed by:  Elier Beasley DO  1/9/2022 7:20 AM CST Workstation: [id number]      CURRENT VISIT EKG  Results for orders placed or performed during the hospital encounter of 01/07/22   EKG 12-lead    Narrative    Test Reason : U07.1,    Vent. Rate :  066 BPM     Atrial Rate : 066 BPM     P-R Int : 168 ms          QRS Dur : 110 ms      QT Int : 432 ms       P-R-T Axes : 061 007 066 degrees     QTc Int : 452 ms    Normal sinus rhythm  Incomplete left bundle branch block  Borderline Abnormal ECG  No previous ECGs available  Confirmed by Abelino Soriano MD ([id number]) on 1/7/2022 6:30:17 PM    Referred By: [email or web address]   SELF           Confirmed By:Abelino Soriano MD       ECHOCARDIOGRAM RESULTS  No results found for this or any previous visit.        ASSESSMENT/PLAN:     Active Hospital Problems    Diagnosis  POA    *Acute on chronic respiratory failure with hypoxia [J96.21]  Unknown      Resolved Hospital Problems   No resolved problems to display.     COVID-19 pneumonia, 1st positive on December 25th, hospitalized at [facility] twice, status post Remdesivir  Acute hypoxemic respiratory failure on maximum Vapotherm and non-rebreather mask, cannot tolerate CPAP  Dementia  Chronic anemia  Acute on chronic kidney disease with metabolic acidosis and prerenal azotemia  Diabetes mellitus  Hyponatremia, likely mild SIADH secondary to her pneumonia  Moderate hypoalbuminemia      Continue Decadron  GI prophylaxis  Tramadol and Tylenol for pain  Discontinue isolation, we are at day 21   Discussed with daughters and they are accepting of letting her be comfortable and lying in bed which is what she really really wants to do.  If family is willing to go to comfort care, we can stop these 3 handfuls of pills she is getting each morning            Lina Gan MD  [facility]  Department of Pulmonology  Date of Service: 01/14/2022  12:37 PM

## 2022-01-14 NOTE — PT/OT/SLP DISCHARGE
Physical Therapy Discharge Summary    Name: Peg Cabezas  MRN: [id number]   Principal Problem: Acute on chronic respiratory failure with hypoxia     Patient Discharged from acute Physical Therapy on 2022.  Please refer to prior PT noted date on 2022 for functional status.     Assessment:     Pt now on comfort measures    Objective:     GOALS:   Multidisciplinary Problems     Physical Therapy Goals        Problem: Physical Therapy Goal    Goal Priority Disciplines Outcome Goal Variances Interventions   Physical Therapy Goal     PT, PT/OT Unable to Meet, Plan Revised     Description: Goals to be met by: Discharge     Patient will increase functional independence with mobility by performin. Supine to sit with Supervision  2. Sit to stand transfer with Supervision  3. Bed to chair transfer with Supervision using Rolling Walker  4. Gait  x 100 feet with Supervision using Rolling Walker.                          Reasons for Discontinuation of Therapy Services  Pt now on comfort measures      Plan:     Patient Discharged to: comfort measures.      2022

## 2022-01-14 NOTE — SUBJECTIVE & OBJECTIVE
Interval History:  No acute events overnight.  Patient remains on Vapotherm.  Family has decided to transition patient to comfort care.  Case discussed with pulmonologist and patient's daughters.    Objective:     Vital Signs (Most Recent):  Temp: 98.1 °F (36.7 °C) (01/14/22 0301)  Pulse: 84 (01/14/22 0900)  Resp: (!) 25 (01/14/22 1251)  BP: (!) 152/68 (01/14/22 0900)  SpO2: (!) 91 % (01/14/22 0900) Vital Signs (24h Range):  Temp:  [97.8 °F (36.6 °C)-98.1 °F (36.7 °C)] 98.1 °F (36.7 °C)  Pulse:  [62-98] 84  Resp:  [21-34] 25  SpO2:  [82 %-98 %] 91 %  BP: (132-160)/() 152/68     Weight: 72.1 kg (158 lb 15.2 oz)  Body mass index is 29.07 kg/m².    Intake/Output Summary (Last 24 hours) at 1/14/2022 1641  Last data filed at 1/13/2022 1715  Gross per 24 hour   Intake 180 ml   Output --   Net 180 ml      Physical Exam daughters at bedside  Patient appears no acute distress  HEENT sclerae nonicteric  Neck is supple  Lungs symmetrical expansion moderate effort scattered rhonchi throughout  Heart regular rate and rhythm  Neuro patient is alert she moves all extremities  Abdomen is soft nontender  Extremities no edema    Significant Labs:   All pertinent labs within the past 24 hours have been reviewed.  BMP:   Recent Labs   Lab 01/14/22 0359   *   *   K 4.2   CL 97   CO2 20*   BUN 59*   CREATININE 1.5*   CALCIUM 9.1   MG 2.2     CBC:   Recent Labs   Lab 01/13/22 0341 01/14/22 0359   WBC 8.35 8.72   HGB 9.9* 10.1*   HCT 31.2* 31.5*    275     CMP:   Recent Labs   Lab 01/13/22 0341 01/14/22 0359   * 131*   K 4.3 4.2   CL 99 97   CO2 19* 20*    118*   BUN 57* 59*   CREATININE 1.5* 1.5*   CALCIUM 9.0 9.1   PROT 6.7 6.6   ALBUMIN 2.5* 2.3*   BILITOT 0.7 0.7   ALKPHOS 61 68   AST 27 61*   ALT 35 78*   ANIONGAP 13 14   EGFRNONAA 32.0* 32.0*       Significant Imaging:

## 2022-01-14 NOTE — RESPIRATORY THERAPY
PT WORE CPAP X 25 MINUTES TOLERATED FAIR, BUT WITH NO IMPROVEMENT IN SATS AND DANGER OF PULLING OFF AND DROPPING INTO 60'S WITH MASK OFF. CPAP WAS DISCONTINUED AND VAPOTHERM AND NRB REPLACED,

## 2022-01-14 NOTE — CARE UPDATE
01/13/22 1943   Patient Assessment/Suction   Level of Consciousness (AVPU) alert   Respiratory Effort Mild;Labored   Expansion/Accessory Muscles/Retractions no use of accessory muscles   All Lung Fields Breath Sounds diminished   Cough Frequency infrequent   Cough Type nonproductive   PRE-TX-O2   O2 Device (Oxygen Therapy) Vapotherm   Humidification temp set 33   Humidification temp actual 33   Flow (L/min) 40   Oxygen Concentration (%) 100   SpO2: Pre-Ductal (Right Hand)   (o2 sat post ambulation)   Pulse Oximetry Type Continuous   $ Pulse Oximetry - Multiple Charge Pulse Oximetry - Multiple   Incentive Spirometer   $ Incentive Spirometer Charges unable to perform  (post ambulation)   Ready to Wean/Extubation Screen   FIO2<=50 (chart decimal) (!) 1   Maintain adequate oxygenation/ventilation/encourage incentive  spriometer

## 2022-01-14 NOTE — PROGRESS NOTES
[facility] Medicine  Progress Note    Patient Name: Peg Cabezas  MRN: [id number]  Patient Class: IP- Inpatient   Admission Date: 1/7/2022  Length of Stay: 7 days  Attending Physician: Omar Apodaca DO  Primary Care Provider: Marcelino Leal MD        Subjective:     Principal Problem:Acute on chronic respiratory failure with hypoxia        HPI:  No notes on file    Overview/Hospital Course:  1/11 Dr Apodaca assumed pt's care   No acute events   1/12 no acute events overnight.  Patient remains on Vapotherm.  Patient remains in ICU pulmonary following  1/13 no acute events overnight patient still requiring Vapotherm.  Pulmonary following.  1/14 discussed case with Pulmonary and patient's daughters.  Daughters are at bedside.  Family wished to transition to comfort care.  They would like to discontinue Vapotherm.  Case discussed with nurse.      Interval History:  No acute events overnight.  Patient remains on Vapotherm.  Family has decided to transition patient to comfort care.  Case discussed with pulmonologist and patient's daughters.    Objective:     Vital Signs (Most Recent):  Temp: 98.1 °F (36.7 °C) (01/14/22 0301)  Pulse: 84 (01/14/22 0900)  Resp: (!) 25 (01/14/22 1251)  BP: (!) 152/68 (01/14/22 0900)  SpO2: (!) 91 % (01/14/22 0900) Vital Signs (24h Range):  Temp:  [97.8 °F (36.6 °C)-98.1 °F (36.7 °C)] 98.1 °F (36.7 °C)  Pulse:  [62-98] 84  Resp:  [21-34] 25  SpO2:  [82 %-98 %] 91 %  BP: (132-160)/() 152/68     Weight: 72.1 kg (158 lb 15.2 oz)  Body mass index is 29.07 kg/m².    Intake/Output Summary (Last 24 hours) at 1/14/2022 1641  Last data filed at 1/13/2022 1715  Gross per 24 hour   Intake 180 ml   Output --   Net 180 ml      Physical Exam daughters at bedside  Patient appears no acute distress  HEENT sclerae nonicteric  Neck is supple  Lungs symmetrical expansion moderate effort scattered rhonchi throughout  Heart regular rate and rhythm  Neuro patient is alert she  moves all extremities  Abdomen is soft nontender  Extremities no edema    Significant Labs:   All pertinent labs within the past 24 hours have been reviewed.  BMP:   Recent Labs   Lab 22  0359   *   *   K 4.2   CL 97   CO2 20*   BUN 59*   CREATININE 1.5*   CALCIUM 9.1   MG 2.2     CBC:   Recent Labs   Lab 22  0341 22  0359   WBC 8.35 8.72   HGB 9.9* 10.1*   HCT 31.2* 31.5*    275     CMP:   Recent Labs   Lab 22  0341 22  0359   * 131*   K 4.3 4.2   CL 99 97   CO2 19* 20*    118*   BUN 57* 59*   CREATININE 1.5* 1.5*   CALCIUM 9.0 9.1   PROT 6.7 6.6   ALBUMIN 2.5* 2.3*   BILITOT 0.7 0.7   ALKPHOS 61 68   AST 27 61*   ALT 35 78*   ANIONGAP 13 14   EGFRNONAA 32.0* 32.0*       Significant Imaging:       Assessment/Plan:      #1 Acute hypoxic respiratory failure  Failed aggressive management.  Family has decided to transition patient to comfort care.  Patient expected to  this admission.  #2 Bilateral COVID pneumonia  #3 Chronic symptomatic anemia  #4 Acute/chronic kidney disease-improved  #5 DM2  #6 Dementia  #7 Hyponatremia-improved     I had a long discussion with patient's daughters.  They wish to transition patient to comfort care.  Will discontinue Vapotherm.  Morphine IV p.r.n. comfort  Case discussed with Pulmonary and nurse  Patient expected to  this admission  Will consult hospice  VTE Risk Mitigation (From admission, onward)         Ordered     IP VTE HIGH RISK PATIENT  Once         22     Place sequential compression device  Until discontinued         22                Discharge Planning   CHRISTIAN:      Code Status: DNR   Is the patient medically ready for discharge?:     Reason for patient still in hospital (select all that apply): Patient trending condition  Discharge Plan A: Comfort care/withdrawal   Discharge Delays: None known at this time              Omar Apodaca DO  Department of Hospital Medicine   [place]  [facility]

## 2022-01-15 NOTE — PLAN OF CARE
01/15/22 0755   Final Note   Assessment Type Final Discharge Note   Anticipated Discharge Disposition

## 2022-01-15 NOTE — DISCHARGE SUMMARY
[facility] Medicine  Discharge Summary      Patient Name: Peg Cabezas  MRN: [id number]  Patient Class: IP- Inpatient  Admission Date: 2022  Hospital Length of Stay: 7 days  Discharge Date and Time:  2022 6:02 PM  Attending Physician: Louann Apodaca DO   Discharging Provider: Louann Apodaca DO  Primary Care Provider: Marcelino Leal MD      HPI:   No notes on file    * No surgery found *      Hospital Course:    Dr Apodaca assumed pt's care   No acute events    no acute events overnight.  Patient remains on Vapotherm.  Patient remains in ICU pulmonary following   no acute events overnight patient still requiring Vapotherm.  Pulmonary following.   discussed case with Pulmonary and patient's daughters.  Daughters are at bedside.  Family wished to transition to comfort care.  They would like to discontinue Vapotherm.  Case discussed with nurse.     patient  at 5:10 p.m. with loving  family at bedside       Goals of Care Treatment Preferences:  Code Status: DNR      Consults:   Consults (From admission, onward)        Status Ordering Provider     Inpatient consult to   Once        Provider:  (Not yet assigned)    Ordered LOUANN APODACA     Inpatient consult to Pulmonology  Once        Provider:  Janiya Stephenson MD    Completed MICKEY MCCOLLUM     Inpatient consult to Hospitalist  Once        Provider:  Mickey Mccollum NP    Acknowledged BOLIVAR ESCALANTE          Discharge diagnosis  #1 Acute hypoxic respiratory failure  #2 Bilateral COVID pneumonia  #3 Chronic symptomatic anemia  #4 Acute/chronic kidney disease-  #5 DM2  #6 Dementia  #7 Hyponatremia-improved      Final Active Diagnoses:    Diagnosis Date Noted POA    PRINCIPAL PROBLEM:  Acute on chronic respiratory failure with hypoxia [J96.21] 2022 Unknown      Problems Resolved During this Admission:       Discharged Condition:     Disposition:      Follow Up:    Patient Instructions:   No discharge procedures on file.    Significant Diagnostic Studies:     Pending Diagnostic Studies:     None         Medications:Indwelling Lines/Drains at time of discharge:   Lines/Drains/Airways     None                 Time spent on the discharge of patient:40 minutes         Omar Apodaca DO  Department of Hospital Medicine  [facility]

## 2022-01-22 NOTE — PT/OT/SLP DISCHARGE
Occupational Therapy Discharge Summary    Peg Cabezas  MRN: [id number]   Principal Problem: Acute on chronic respiratory failure with hypoxia      Patient Discharged from acute Occupational Therapy on 2022.  Please refer to prior OT note dated 2022 for functional status.    Assessment:      pt      Objective:     GOALS:   Multidisciplinary Problems     Occupational Therapy Goals        Problem: Occupational Therapy Goal    Goal Priority Disciplines Outcome Interventions   Occupational Therapy Goal     OT, PT/OT Ongoing, Progressing    Description: Goals to be met by: discharge     Patient will increase functional independence with ADLs by performing:    Feeding with Modified [place].  UE Dressing with Supervision.  LE Dressing with Minimal Assistance.  Grooming while seated at sink with Stand-by Assistance.  Toileting from bedside commode with Contact Guard Assistance for hygiene and clothing management.   Toilet transfer to bedside commode with Contact Guard Assistance.  Increased functional strength to 4+5 for B UE reaching during ADL tasks.                     Reasons for Discontinuation of Therapy Services  pt        Plan:     Patient Discharged to: pt      2022